# Patient Record
Sex: FEMALE | Race: WHITE | NOT HISPANIC OR LATINO | Employment: OTHER | URBAN - METROPOLITAN AREA
[De-identification: names, ages, dates, MRNs, and addresses within clinical notes are randomized per-mention and may not be internally consistent; named-entity substitution may affect disease eponyms.]

---

## 2017-01-23 ENCOUNTER — HISTORICAL (OUTPATIENT)
Dept: ADMINISTRATIVE | Facility: HOSPITAL | Age: 62
End: 2017-01-23

## 2017-03-06 ENCOUNTER — HISTORICAL (OUTPATIENT)
Dept: INFUSION THERAPY | Facility: HOSPITAL | Age: 62
End: 2017-03-06

## 2017-07-12 ENCOUNTER — HISTORICAL (OUTPATIENT)
Dept: RADIOLOGY | Facility: HOSPITAL | Age: 62
End: 2017-07-12

## 2017-07-13 ENCOUNTER — HISTORICAL (OUTPATIENT)
Dept: ADMINISTRATIVE | Facility: HOSPITAL | Age: 62
End: 2017-07-13

## 2017-07-13 LAB
ABS NEUT (OLG): 6.95 X10(3)/MCL (ref 2.1–9.2)
ALBUMIN SERPL-MCNC: 3.9 GM/DL (ref 3.4–5)
ALBUMIN/GLOB SERPL: 1.3 RATIO (ref 1.1–2)
ALP SERPL-CCNC: 67 UNIT/L (ref 38–126)
ALT SERPL-CCNC: 23 UNIT/L (ref 12–78)
APPEARANCE, UA: CLEAR
AST SERPL-CCNC: 14 UNIT/L (ref 15–37)
BACTERIA #/AREA URNS AUTO: ABNORMAL /HPF
BASOPHILS # BLD AUTO: 0.1 X10(3)/MCL (ref 0–0.2)
BASOPHILS NFR BLD AUTO: 1 %
BILIRUB SERPL-MCNC: 0.4 MG/DL (ref 0.2–1)
BILIRUB UR QL STRIP: NEGATIVE
BILIRUBIN DIRECT+TOT PNL SERPL-MCNC: 0.1 MG/DL (ref 0–0.5)
BILIRUBIN DIRECT+TOT PNL SERPL-MCNC: 0.3 MG/DL (ref 0–0.8)
BUN SERPL-MCNC: 14 MG/DL (ref 7–18)
CALCIUM SERPL-MCNC: 8.7 MG/DL (ref 8.5–10.1)
CHLORIDE SERPL-SCNC: 109 MMOL/L (ref 98–107)
CO2 SERPL-SCNC: 25 MMOL/L (ref 21–32)
COLOR UR: YELLOW
CREAT SERPL-MCNC: 0.96 MG/DL (ref 0.55–1.02)
EOSINOPHIL # BLD AUTO: 0.2 X10(3)/MCL (ref 0–0.9)
EOSINOPHIL NFR BLD AUTO: 2 %
ERYTHROCYTE [DISTWIDTH] IN BLOOD BY AUTOMATED COUNT: 13 % (ref 11.5–17)
GLOBULIN SER-MCNC: 3 GM/DL (ref 2.4–3.5)
GLUCOSE (UA): NEGATIVE
GLUCOSE SERPL-MCNC: 94 MG/DL (ref 74–106)
HAV IGM SERPL QL IA: NEGATIVE
HBV CORE IGM SERPL QL IA: NEGATIVE
HBV SURFACE AG SERPL QL IA: NEGATIVE
HCT VFR BLD AUTO: 43.7 % (ref 37–47)
HCV AB SERPL QL IA: NEGATIVE
HEPATITIS PANEL INTERP: NORMAL
HGB BLD-MCNC: 14.6 GM/DL (ref 12–16)
HGB UR QL STRIP: ABNORMAL
KETONES UR QL STRIP: NEGATIVE
LEUKOCYTE ESTERASE UR QL STRIP: ABNORMAL
LYMPHOCYTES # BLD AUTO: 1.6 X10(3)/MCL (ref 0.6–4.6)
LYMPHOCYTES NFR BLD AUTO: 17 %
MCH RBC QN AUTO: 29 PG (ref 27–31)
MCHC RBC AUTO-ENTMCNC: 33.4 GM/DL (ref 33–36)
MCV RBC AUTO: 86.9 FL (ref 80–94)
MONOCYTES # BLD AUTO: 0.4 X10(3)/MCL (ref 0.1–1.3)
MONOCYTES NFR BLD AUTO: 4 %
NEUTROPHILS # BLD AUTO: 6.95 X10(3)/MCL (ref 2.1–9.2)
NEUTROPHILS NFR BLD AUTO: 75 %
NITRITE UR QL STRIP.AUTO: POSITIVE
PH UR STRIP: 6 [PH] (ref 5–9)
PLATELET # BLD AUTO: 272 X10(3)/MCL (ref 130–400)
PMV BLD AUTO: 9.7 FL (ref 9.4–12.4)
POTASSIUM SERPL-SCNC: 3.6 MMOL/L (ref 3.5–5.1)
PROT SERPL-MCNC: 6.9 GM/DL (ref 6.4–8.2)
PROT UR QL STRIP: NEGATIVE
RBC # BLD AUTO: 5.03 X10(6)/MCL (ref 4.2–5.4)
RBC #/AREA URNS HPF: ABNORMAL /HPF
SODIUM SERPL-SCNC: 147 MMOL/L (ref 136–145)
SP GR UR STRIP: 1.02 (ref 1–1.03)
SQUAMOUS EPITHELIAL, UA: ABNORMAL
TSH SERPL-ACNC: 0.8 MIU/ML (ref 0.36–3.74)
UROBILINOGEN UR STRIP-ACNC: 0.2
WBC # SPEC AUTO: 9.3 X10(3)/MCL (ref 4.5–11.5)
WBC #/AREA URNS AUTO: ABNORMAL /HPF

## 2017-12-27 ENCOUNTER — HISTORICAL (OUTPATIENT)
Dept: ADMINISTRATIVE | Facility: HOSPITAL | Age: 62
End: 2017-12-27

## 2018-07-10 ENCOUNTER — HISTORICAL (OUTPATIENT)
Dept: INFUSION THERAPY | Facility: HOSPITAL | Age: 63
End: 2018-07-10

## 2018-07-23 ENCOUNTER — HISTORICAL (OUTPATIENT)
Dept: LAB | Facility: HOSPITAL | Age: 63
End: 2018-07-23

## 2019-02-06 ENCOUNTER — HISTORICAL (OUTPATIENT)
Dept: INFUSION THERAPY | Facility: HOSPITAL | Age: 64
End: 2019-02-06

## 2019-04-05 ENCOUNTER — HISTORICAL (OUTPATIENT)
Dept: RADIOLOGY | Facility: HOSPITAL | Age: 64
End: 2019-04-05

## 2019-07-12 ENCOUNTER — HISTORICAL (OUTPATIENT)
Dept: ADMINISTRATIVE | Facility: HOSPITAL | Age: 64
End: 2019-07-12

## 2019-12-30 ENCOUNTER — HISTORICAL (OUTPATIENT)
Dept: ADMINISTRATIVE | Facility: HOSPITAL | Age: 64
End: 2019-12-30

## 2020-07-13 ENCOUNTER — HISTORICAL (OUTPATIENT)
Dept: CARDIOLOGY | Facility: HOSPITAL | Age: 65
End: 2020-07-13

## 2020-07-13 ENCOUNTER — HISTORICAL (OUTPATIENT)
Dept: ADMINISTRATIVE | Facility: HOSPITAL | Age: 65
End: 2020-07-13

## 2020-07-29 ENCOUNTER — HISTORICAL (OUTPATIENT)
Dept: INFUSION THERAPY | Facility: HOSPITAL | Age: 65
End: 2020-07-29

## 2021-02-01 ENCOUNTER — HISTORICAL (OUTPATIENT)
Dept: INFUSION THERAPY | Facility: HOSPITAL | Age: 66
End: 2021-02-01

## 2021-07-08 ENCOUNTER — HISTORICAL (OUTPATIENT)
Dept: ADMINISTRATIVE | Facility: HOSPITAL | Age: 66
End: 2021-07-08

## 2021-07-10 LAB — FINAL CULTURE: NORMAL

## 2021-07-16 ENCOUNTER — HISTORICAL (OUTPATIENT)
Dept: RADIOLOGY | Facility: HOSPITAL | Age: 66
End: 2021-07-16

## 2021-08-02 ENCOUNTER — HISTORICAL (OUTPATIENT)
Dept: INFUSION THERAPY | Facility: HOSPITAL | Age: 66
End: 2021-08-02

## 2021-12-20 ENCOUNTER — HISTORICAL (OUTPATIENT)
Dept: ADMINISTRATIVE | Facility: HOSPITAL | Age: 66
End: 2021-12-20

## 2021-12-20 LAB
ABS NEUT (OLG): 9.1 X10(3)/MCL (ref 2.1–9.2)
BASOPHILS # BLD AUTO: 0.1 X10(3)/MCL (ref 0–0.2)
BASOPHILS NFR BLD AUTO: 1 %
BUN SERPL-MCNC: 18.9 MG/DL (ref 9.8–20.1)
CALCIUM SERPL-MCNC: 9.2 MG/DL (ref 8.7–10.5)
CHLORIDE SERPL-SCNC: 105 MMOL/L (ref 98–107)
CO2 SERPL-SCNC: 28 MMOL/L (ref 23–31)
CREAT SERPL-MCNC: 0.88 MG/DL (ref 0.55–1.02)
CREAT/UREA NIT SERPL: 21
EOSINOPHIL # BLD AUTO: 0.2 X10(3)/MCL (ref 0–0.9)
EOSINOPHIL NFR BLD AUTO: 1 %
ERYTHROCYTE [DISTWIDTH] IN BLOOD BY AUTOMATED COUNT: 13.2 % (ref 11.5–17)
GLUCOSE SERPL-MCNC: 91 MG/DL (ref 82–115)
HCT VFR BLD AUTO: 44.7 % (ref 37–47)
HGB BLD-MCNC: 14.6 GM/DL (ref 12–16)
LYMPHOCYTES # BLD AUTO: 1.7 X10(3)/MCL (ref 0.6–4.6)
LYMPHOCYTES NFR BLD AUTO: 14 %
MCH RBC QN AUTO: 29.1 PG (ref 27–31)
MCHC RBC AUTO-ENTMCNC: 32.7 GM/DL (ref 33–36)
MCV RBC AUTO: 89 FL (ref 80–94)
MONOCYTES # BLD AUTO: 0.6 X10(3)/MCL (ref 0.1–1.3)
MONOCYTES NFR BLD AUTO: 5 %
NEUTROPHILS # BLD AUTO: 9.1 X10(3)/MCL (ref 2.1–9.2)
NEUTROPHILS NFR BLD AUTO: 77 %
PLATELET # BLD AUTO: 331 X10(3)/MCL (ref 130–400)
PMV BLD AUTO: 9.7 FL (ref 9.4–12.4)
POTASSIUM SERPL-SCNC: 4.4 MMOL/L (ref 3.5–5.1)
RBC # BLD AUTO: 5.02 X10(6)/MCL (ref 4.2–5.4)
SODIUM SERPL-SCNC: 140 MMOL/L (ref 136–145)
WBC # SPEC AUTO: 11.8 X10(3)/MCL (ref 4.5–11.5)

## 2022-02-02 ENCOUNTER — HISTORICAL (OUTPATIENT)
Dept: INFUSION THERAPY | Facility: HOSPITAL | Age: 67
End: 2022-02-02

## 2022-04-05 PROBLEM — M81.0 AGE-RELATED OSTEOPOROSIS WITHOUT CURRENT PATHOLOGICAL FRACTURE: Status: ACTIVE | Noted: 2022-04-05

## 2022-04-09 ENCOUNTER — HISTORICAL (OUTPATIENT)
Dept: ADMINISTRATIVE | Facility: HOSPITAL | Age: 67
End: 2022-04-09

## 2022-04-25 VITALS
DIASTOLIC BLOOD PRESSURE: 65 MMHG | WEIGHT: 134.25 LBS | HEIGHT: 62 IN | BODY MASS INDEX: 24.7 KG/M2 | SYSTOLIC BLOOD PRESSURE: 92 MMHG | OXYGEN SATURATION: 99 %

## 2022-07-11 ENCOUNTER — LAB VISIT (OUTPATIENT)
Dept: LAB | Facility: HOSPITAL | Age: 67
End: 2022-07-11
Attending: FAMILY MEDICINE
Payer: MEDICARE

## 2022-07-11 DIAGNOSIS — I10 ESSENTIAL HYPERTENSION, MALIGNANT: Primary | ICD-10-CM

## 2022-07-11 DIAGNOSIS — E04.1 NONTOXIC UNINODULAR GOITER: ICD-10-CM

## 2022-07-11 DIAGNOSIS — E78.5 HYPERLIPIDEMIA, UNSPECIFIED HYPERLIPIDEMIA TYPE: ICD-10-CM

## 2022-07-11 DIAGNOSIS — E55.9 AVITAMINOSIS D: ICD-10-CM

## 2022-07-11 LAB
ALBUMIN SERPL-MCNC: 3.9 GM/DL (ref 3.4–4.8)
ALBUMIN/GLOB SERPL: 1.6 RATIO (ref 1.1–2)
ALP SERPL-CCNC: 79 UNIT/L (ref 40–150)
ALT SERPL-CCNC: 22 UNIT/L (ref 0–55)
AST SERPL-CCNC: 17 UNIT/L (ref 5–34)
BASOPHILS # BLD AUTO: 0.06 X10(3)/MCL (ref 0–0.2)
BASOPHILS NFR BLD AUTO: 0.6 %
BILIRUBIN DIRECT+TOT PNL SERPL-MCNC: 0.4 MG/DL
BUN SERPL-MCNC: 15.3 MG/DL (ref 9.8–20.1)
CALCIUM SERPL-MCNC: 9.4 MG/DL (ref 8.4–10.2)
CHLORIDE SERPL-SCNC: 106 MMOL/L (ref 98–107)
CHOLEST SERPL-MCNC: 146 MG/DL
CHOLEST/HDLC SERPL: 4 {RATIO} (ref 0–5)
CO2 SERPL-SCNC: 23 MMOL/L (ref 23–31)
CREAT SERPL-MCNC: 0.85 MG/DL (ref 0.55–1.02)
DEPRECATED CALCIDIOL+CALCIFEROL SERPL-MC: 43.6 NG/ML (ref 30–80)
EOSINOPHIL # BLD AUTO: 0.1 X10(3)/MCL (ref 0–0.9)
EOSINOPHIL NFR BLD AUTO: 1 %
ERYTHROCYTE [DISTWIDTH] IN BLOOD BY AUTOMATED COUNT: 13.7 % (ref 11.5–17)
GLOBULIN SER-MCNC: 2.5 GM/DL (ref 2.4–3.5)
GLUCOSE SERPL-MCNC: 71 MG/DL (ref 82–115)
HCT VFR BLD AUTO: 43.8 % (ref 37–47)
HDLC SERPL-MCNC: 38 MG/DL (ref 35–60)
HGB BLD-MCNC: 14.2 GM/DL (ref 12–16)
IMM GRANULOCYTES # BLD AUTO: 0.15 X10(3)/MCL (ref 0–0.04)
IMM GRANULOCYTES NFR BLD AUTO: 1.5 %
LDLC SERPL CALC-MCNC: 81 MG/DL (ref 50–140)
LYMPHOCYTES # BLD AUTO: 1.82 X10(3)/MCL (ref 0.6–4.6)
LYMPHOCYTES NFR BLD AUTO: 17.8 %
MCH RBC QN AUTO: 28.5 PG (ref 27–31)
MCHC RBC AUTO-ENTMCNC: 32.4 MG/DL (ref 33–36)
MCV RBC AUTO: 88 FL (ref 80–94)
MONOCYTES # BLD AUTO: 0.49 X10(3)/MCL (ref 0.1–1.3)
MONOCYTES NFR BLD AUTO: 4.8 %
NEUTROPHILS # BLD AUTO: 7.6 X10(3)/MCL (ref 2.1–9.2)
NEUTROPHILS NFR BLD AUTO: 74.3 %
NRBC BLD AUTO-RTO: 0 %
PLATELET # BLD AUTO: 312 X10(3)/MCL (ref 130–400)
PMV BLD AUTO: 9.7 FL (ref 7.4–10.4)
POTASSIUM SERPL-SCNC: 4.3 MMOL/L (ref 3.5–5.1)
PROT SERPL-MCNC: 6.4 GM/DL (ref 5.8–7.6)
RBC # BLD AUTO: 4.98 X10(6)/MCL (ref 4.2–5.4)
SODIUM SERPL-SCNC: 142 MMOL/L (ref 136–145)
TRIGL SERPL-MCNC: 134 MG/DL (ref 37–140)
TSH SERPL-ACNC: 0.92 UIU/ML (ref 0.35–4.94)
VLDLC SERPL CALC-MCNC: 27 MG/DL
WBC # SPEC AUTO: 10.2 X10(3)/MCL (ref 4.5–11.5)

## 2022-07-11 PROCEDURE — 84443 ASSAY THYROID STIM HORMONE: CPT

## 2022-07-11 PROCEDURE — 80061 LIPID PANEL: CPT

## 2022-07-11 PROCEDURE — 36415 COLL VENOUS BLD VENIPUNCTURE: CPT

## 2022-07-11 PROCEDURE — 80053 COMPREHEN METABOLIC PANEL: CPT

## 2022-07-11 PROCEDURE — 82306 VITAMIN D 25 HYDROXY: CPT

## 2022-07-11 PROCEDURE — 85025 COMPLETE CBC W/AUTO DIFF WBC: CPT

## 2022-07-18 ENCOUNTER — OFFICE VISIT (OUTPATIENT)
Dept: PRIMARY CARE CLINIC | Facility: CLINIC | Age: 67
End: 2022-07-18
Payer: MEDICARE

## 2022-07-18 VITALS
HEART RATE: 74 BPM | SYSTOLIC BLOOD PRESSURE: 130 MMHG | DIASTOLIC BLOOD PRESSURE: 72 MMHG | OXYGEN SATURATION: 98 % | WEIGHT: 133.88 LBS | BODY MASS INDEX: 24.33 KG/M2

## 2022-07-18 DIAGNOSIS — Z12.31 BREAST CANCER SCREENING BY MAMMOGRAM: ICD-10-CM

## 2022-07-18 DIAGNOSIS — E55.9 VITAMIN D DEFICIENCY: ICD-10-CM

## 2022-07-18 DIAGNOSIS — E07.9 THYROID DYSFUNCTION: ICD-10-CM

## 2022-07-18 DIAGNOSIS — G47.00 INSOMNIA, UNSPECIFIED TYPE: ICD-10-CM

## 2022-07-18 DIAGNOSIS — D64.9 ANEMIA, UNSPECIFIED TYPE: ICD-10-CM

## 2022-07-18 DIAGNOSIS — Z00.00 WELLNESS EXAMINATION: Primary | ICD-10-CM

## 2022-07-18 DIAGNOSIS — E78.5 HYPERLIPIDEMIA, UNSPECIFIED HYPERLIPIDEMIA TYPE: ICD-10-CM

## 2022-07-18 DIAGNOSIS — Z12.11 COLON CANCER SCREENING: ICD-10-CM

## 2022-07-18 LAB
APPEARANCE UR: CLEAR
BACTERIA #/AREA URNS AUTO: ABNORMAL /HPF
BILIRUB UR QL STRIP.AUTO: NEGATIVE MG/DL
COLOR UR AUTO: YELLOW
GLUCOSE UR QL STRIP.AUTO: NEGATIVE MG/DL
KETONES UR QL STRIP.AUTO: NEGATIVE MG/DL
LEUKOCYTE ESTERASE UR QL STRIP.AUTO: ABNORMAL UNIT/L
NITRITE UR QL STRIP.AUTO: NEGATIVE
PH UR STRIP.AUTO: 6.5 [PH]
PROT UR QL STRIP.AUTO: ABNORMAL MG/DL
RBC #/AREA URNS AUTO: ABNORMAL /HPF
RBC UR QL AUTO: NEGATIVE UNIT/L
SP GR UR STRIP.AUTO: 1.02 (ref 1–1.03)
SQUAMOUS #/AREA URNS AUTO: ABNORMAL /HPF
UROBILINOGEN UR STRIP-ACNC: 0.2 MG/DL
WBC #/AREA URNS AUTO: ABNORMAL /HPF

## 2022-07-18 PROCEDURE — 81001 URINALYSIS AUTO W/SCOPE: CPT | Performed by: FAMILY MEDICINE

## 2022-07-18 PROCEDURE — 99397 PR PREVENTIVE VISIT,EST,65 & OVER: ICD-10-PCS | Mod: GY,,, | Performed by: FAMILY MEDICINE

## 2022-07-18 PROCEDURE — 99397 PER PM REEVAL EST PAT 65+ YR: CPT | Mod: GY,,, | Performed by: FAMILY MEDICINE

## 2022-07-18 RX ORDER — TRIAMTERENE/HYDROCHLOROTHIAZID 37.5-25 MG
TABLET ORAL
COMMUNITY
Start: 2022-01-19 | End: 2023-04-04 | Stop reason: SDUPTHER

## 2022-07-18 RX ORDER — CYANOCOBALAMIN 1000 UG/ML
INJECTION, SOLUTION INTRAMUSCULAR; SUBCUTANEOUS
COMMUNITY
Start: 2022-02-14 | End: 2023-01-05 | Stop reason: SDUPTHER

## 2022-07-18 RX ORDER — TRAZODONE HYDROCHLORIDE 50 MG/1
50 TABLET ORAL NIGHTLY
Qty: 30 TABLET | Refills: 11 | OUTPATIENT
Start: 2022-07-18 | End: 2022-07-18 | Stop reason: SDUPTHER

## 2022-07-18 RX ORDER — LIDOCAINE 50 MG/G
OINTMENT TOPICAL
COMMUNITY
End: 2022-08-11

## 2022-07-18 RX ORDER — MUPIROCIN 20 MG/G
OINTMENT TOPICAL
COMMUNITY
Start: 2022-03-30 | End: 2022-08-11

## 2022-07-18 RX ORDER — SIMVASTATIN 5 MG/1
5 TABLET, FILM COATED ORAL
COMMUNITY
Start: 2022-01-19 | End: 2023-01-03

## 2022-07-18 RX ORDER — PREDNISONE 20 MG/1
TABLET ORAL
COMMUNITY
End: 2022-08-11

## 2022-07-18 RX ORDER — HYDROCODONE BITARTRATE AND ACETAMINOPHEN 10; 325 MG/1; MG/1
TABLET ORAL
COMMUNITY

## 2022-07-18 RX ORDER — DICLOFENAC SODIUM 30 MG/G
GEL TOPICAL
COMMUNITY
End: 2022-08-11

## 2022-07-18 RX ORDER — PRAVASTATIN SODIUM 20 MG/1
TABLET ORAL
COMMUNITY
End: 2022-08-11

## 2022-07-18 RX ORDER — FLUOCINONIDE 1 MG/G
CREAM TOPICAL
COMMUNITY
End: 2022-08-11

## 2022-07-18 RX ORDER — TRETINOIN 0.25 MG/G
CREAM TOPICAL
COMMUNITY
Start: 2022-07-14 | End: 2022-08-11

## 2022-07-18 RX ORDER — DOXYCYCLINE 50 MG/1
100 TABLET ORAL NIGHTLY
COMMUNITY
Start: 2022-07-12 | End: 2022-08-11

## 2022-07-18 RX ORDER — TRAZODONE HYDROCHLORIDE 50 MG/1
50 TABLET ORAL NIGHTLY
Qty: 30 TABLET | Refills: 11 | Status: SHIPPED | OUTPATIENT
Start: 2022-07-18 | End: 2022-08-11

## 2022-07-18 RX ORDER — DOXEPIN HYDROCHLORIDE 50 MG/G
CREAM TOPICAL
COMMUNITY
End: 2022-08-11

## 2022-07-18 NOTE — PROGRESS NOTES
Chief Complaint  Chief Complaint   Patient presents with    Medicare AWV       History of Present Illness  Patient presents clinic for routine follow-up wellness visit with lab review.  Blood work performed prior to this visit includes cbc and CMP with no overly concerning findings noted with vitamin-D levels within normal range as well as TSH with FLP showing LDL at 81. Blood pressure within normal limits in clinic at this time the patient reports compliance with current medication regimen as noted below.  She does report cloudy urine today.    Review of Systems  Review of Systems   Constitutional: Negative for chills and fever.   HENT: Negative for congestion and sore throat.    Eyes: Negative for redness and visual disturbance.   Respiratory: Negative for cough and shortness of breath.    Cardiovascular: Negative for chest pain and palpitations.   Gastrointestinal: Negative for nausea and vomiting.   Genitourinary: Negative for dysuria and frequency.        Cloudy urine   Musculoskeletal: Negative for arthralgias and myalgias.   Skin: Negative for pallor and rash.   Neurological: Negative for dizziness and headaches.   Psychiatric/Behavioral: Negative for agitation and dysphoric mood.       Physical Exam  Physical Exam  Constitutional:       Appearance: Normal appearance.   HENT:      Head: Normocephalic and atraumatic.   Eyes:      General: No scleral icterus.     Conjunctiva/sclera: Conjunctivae normal.   Cardiovascular:      Rate and Rhythm: Normal rate and regular rhythm.   Pulmonary:      Effort: Pulmonary effort is normal.      Breath sounds: Normal breath sounds.   Skin:     General: Skin is warm and dry.   Neurological:      General: No focal deficit present.      Mental Status: She is alert and oriented to person, place, and time.   Psychiatric:         Mood and Affect: Mood normal.         Behavior: Behavior normal.         Problem List/Past Medical History  Past Medical History:   Diagnosis Date  "   Cerebral aneurysm     HTN (hypertension)     Osteoporosis     Vitamin D deficiency        Procedure/Surgical History  Past Surgical History:   Procedure Laterality Date    TOTAL HIP ARTHROPLASTY      TUBAL LIGATION      WISDOM TOOTH EXTRACTION         Medications  Current Outpatient Medications on File Prior to Visit   Medication Sig Dispense Refill    doxycycline (ADOXA) 50 MG tablet Take 100 mg by mouth nightly.       No current facility-administered medications on file prior to visit.       Allegies  Review of patient's allergies indicates:   Allergen Reactions    Azithromycin      Other reaction(s): wakes up, wakes up "freaking out"    Codeine Itching    Cortisone      Other reaction(s): feels like she isnt breathing        Social History  Social History     Socioeconomic History    Marital status: Single   Tobacco Use    Smoking status: Current Every Day Smoker     Packs/day: 1.00    Smokeless tobacco: Never Used   Substance and Sexual Activity    Alcohol use: Never    Drug use: Never    Sexual activity: Yes       Family History  History reviewed. No pertinent family history.      Immunizations    There is no immunization history on file for this patient.    Health Maintenance  Health Maintenance   Topic Date Due    TETANUS VACCINE  Never done    DEXA Scan  04/05/2022    Mammogram  07/16/2022    Lipid Panel  07/11/2027    Hepatitis C Screening  Completed        Assessment / Plan  Problem List Items Addressed This Visit        Other    Wellness examination - Primary    Current Assessment & Plan     Discussed routine annual health maintenance and screening in addition to acute issues noted below.             Urine shows no signs concerning for infection, we will send for urinalysis    RTC 12 months for wellness    Nathan Ferrara     "

## 2022-08-02 ENCOUNTER — INFUSION (OUTPATIENT)
Dept: INFUSION THERAPY | Facility: HOSPITAL | Age: 67
End: 2022-08-02
Attending: FAMILY MEDICINE
Payer: MEDICARE

## 2022-08-02 VITALS
HEART RATE: 81 BPM | SYSTOLIC BLOOD PRESSURE: 141 MMHG | RESPIRATION RATE: 18 BRPM | DIASTOLIC BLOOD PRESSURE: 83 MMHG | TEMPERATURE: 98 F | OXYGEN SATURATION: 97 %

## 2022-08-02 DIAGNOSIS — M81.0 AGE-RELATED OSTEOPOROSIS WITHOUT CURRENT PATHOLOGICAL FRACTURE: Primary | ICD-10-CM

## 2022-08-02 DIAGNOSIS — M81.0 AGE-RELATED OSTEOPOROSIS WITHOUT CURRENT PATHOLOGICAL FRACTURE: ICD-10-CM

## 2022-08-02 PROCEDURE — 96372 THER/PROPH/DIAG INJ SC/IM: CPT

## 2022-08-02 PROCEDURE — 63600175 PHARM REV CODE 636 W HCPCS: Mod: JG | Performed by: FAMILY MEDICINE

## 2022-08-02 RX ADMIN — DENOSUMAB 60 MG: 60 INJECTION SUBCUTANEOUS at 01:08

## 2022-08-11 ENCOUNTER — OFFICE VISIT (OUTPATIENT)
Dept: URGENT CARE | Facility: CLINIC | Age: 67
End: 2022-08-11
Payer: MEDICARE

## 2022-08-11 VITALS
HEIGHT: 62 IN | TEMPERATURE: 100 F | WEIGHT: 130 LBS | SYSTOLIC BLOOD PRESSURE: 104 MMHG | DIASTOLIC BLOOD PRESSURE: 66 MMHG | OXYGEN SATURATION: 97 % | HEART RATE: 116 BPM | BODY MASS INDEX: 23.92 KG/M2 | RESPIRATION RATE: 24 BRPM

## 2022-08-11 DIAGNOSIS — K59.00 CONSTIPATION, UNSPECIFIED CONSTIPATION TYPE: Primary | ICD-10-CM

## 2022-08-11 PROCEDURE — 99213 PR OFFICE/OUTPT VISIT, EST, LEVL III, 20-29 MIN: ICD-10-PCS | Mod: ,,, | Performed by: PHYSICIAN ASSISTANT

## 2022-08-11 PROCEDURE — 99213 OFFICE O/P EST LOW 20 MIN: CPT | Mod: ,,, | Performed by: PHYSICIAN ASSISTANT

## 2022-08-11 RX ORDER — CLINDAMYCIN PHOSPHATE 11.9 MG/ML
SOLUTION TOPICAL
COMMUNITY
Start: 2022-08-11 | End: 2023-06-15 | Stop reason: ALTCHOICE

## 2022-08-11 RX ORDER — TRIAMTERENE AND HYDROCHLOROTHIAZIDE 37.5; 25 MG/1; MG/1
CAPSULE ORAL
COMMUNITY
End: 2023-06-15 | Stop reason: SDUPTHER

## 2022-08-11 NOTE — PATIENT INSTRUCTIONS
Increase fluids.  Over-the-counter laxative  .  Glycerin suppositories as discussed.    Follow-up with your primary care doctor  Go to the emergency department with any change or worsening of symptoms

## 2022-08-11 NOTE — PROGRESS NOTES
"Subjective:       Patient ID: Prudence Granados is a 67 y.o. female.    Vitals:  height is 5' 2" (1.575 m) and weight is 59 kg (130 lb). Her temperature is 99.5 °F (37.5 °C). Her blood pressure is 104/66 and her pulse is 116 (abnormal). Her respiration is 24 (abnormal) and oxygen saturation is 97%.     Chief Complaint: Constipation    Pt has had a BM in the last 6 days BM, pain to poop, dehydrated. Pt did two enemas and took Miralax.  Patient is unsure as to why she may be dehydrated but has not been experiencing some dehydration dry mouth and decreased urination.  Noted mildly elevated temperature on intake.  No reported fever at home.    ROS    Objective:      Physical Exam   Constitutional: She is oriented to person, place, and time. She appears well-developed.   HENT:   Head: Normocephalic and atraumatic.   Ears:   Right Ear: External ear normal.   Left Ear: External ear normal.   Nose: Nose normal.   Mouth/Throat: Mucous membranes are normal.   Eyes: Conjunctivae and lids are normal.   Neck: Trachea normal. Neck supple.   Cardiovascular: Normal rate, regular rhythm and normal heart sounds.   Pulmonary/Chest: Effort normal and breath sounds normal. No respiratory distress.   Abdominal: Normal appearance. She exhibits no abdominal bruit, no pulsatile midline mass and no mass. Soft. There is abdominal tenderness.   Musculoskeletal: Normal range of motion.         General: Normal range of motion.   Neurological: She is alert and oriented to person, place, and time. She has normal strength.   Skin: Skin is warm, dry, intact, not diaphoretic and not pale.   Psychiatric: Her speech is normal and behavior is normal. Judgment and thought content normal.   Nursing note and vitals reviewed.  Abdomen:  Sluggish bowel sounds.  Mild TTP noted throughout the lower abdomen.  X-rays-increased stool presents Noted.  Awaiting Radiology over-read.         Previous History      Review of patient's allergies indicates:   Allergen " "Reactions    Azithromycin      Other reaction(s): wakes up, wakes up "freaking out"    Codeine Itching    Cortisone      Other reaction(s): feels like she isnt breathing       Past Medical History:   Diagnosis Date    Cerebral aneurysm     HTN (hypertension)     Osteoporosis     Vitamin D deficiency      Current Outpatient Medications   Medication Instructions    clindamycin (CLEOCIN T) 1 % external solution Topical (Top)    cyanocobalamin 1,000 mcg/mL injection INJECT 1 ML IN THE MUSCLE EVERY 3 WEEKS    HYDROcodone-acetaminophen (NORCO)  mg per tablet hydrocodone 10 mg-acetaminophen 325 mg tablet    simvastatin (ZOCOR) 5 mg, Oral    triamterene-hydrochlorothiazide 37.5-25 mg (DYAZIDE) 37.5-25 mg per capsule triamterene 37.5 mg-hydrochlorothiazide 25 mg capsule    triamterene-hydrochlorothiazide 37.5-25 mg (MAXZIDE-25) 37.5-25 mg per tablet Oral     Past Surgical History:   Procedure Laterality Date    TOTAL HIP ARTHROPLASTY      TUBAL LIGATION      WISDOM TOOTH EXTRACTION       History reviewed. No pertinent family history.    Social History     Tobacco Use    Smoking status: Current Every Day Smoker     Packs/day: 1.00     Types: Cigarettes    Smokeless tobacco: Never Used   Substance Use Topics    Alcohol use: Never    Drug use: Never        Physical Exam      Vital Signs Reviewed   /66   Pulse (!) 116   Temp 99.5 °F (37.5 °C)   Resp (!) 24   Ht 5' 2" (1.575 m)   Wt 59 kg (130 lb)   SpO2 97%   BMI 23.78 kg/m²        Procedures    Procedures     Labs     Results for orders placed or performed in visit on 07/18/22   Urinalysis, Reflex to Urine Culture Urine, Clean Catch    Specimen: Urine   Result Value Ref Range    Color, UA Yellow Yellow, Colorless, Other, Clear    Appearance, UA Clear Clear    Specific Gravity, UA 1.016 1.001 - 1.030    pH, UA 6.5 5.0, 5.5, 6.0, 6.5, 7.0, 7.5, 8.0, 8.5    Protein, UA Trace (A) Negative, 300  mg/dL    Glucose, UA Negative Negative, Normal " mg/dL    Ketones, UA Negative Negative, +1, +2, +3, +4, +5, >=160, >=80 mg/dL    Blood, UA Negative Negative unit/L    Bilirubin, UA Negative Negative mg/dL    Urobilinogen, UA 0.2 0.2, 1.0, Normal mg/dL    Nitrites, UA Negative Negative    Leukocyte Esterase, UA 1+ (A) Negative, 75  unit/L   Urinalysis, Microscopic   Result Value Ref Range    RBC, UA 3-5 None Seen, 0-2, 3-5, 0-5 /HPF    WBC, UA 3-5 None Seen, 0-2, 3-5, 0-5 /HPF    Squamous Epithelial Cells, UA 8-12 (A) 0-4, None Seen /HPF    Bacteria, UA 1+ (A) None Seen, Rare, Occasional /HPF         Assessment:       1. Constipation, unspecified constipation type          Plan:         Constipation, unspecified constipation type  -     XR ABDOMEN FLAT AND ERECT; Future; Expected date: 08/11/2022      Increase fluids.  Over-the-counter laxative  .  Glycerin suppositories as discussed.    Follow-up with your primary care doctor  Go to the emergency department with any change or worsening of symptoms

## 2022-10-04 ENCOUNTER — HOSPITAL ENCOUNTER (OUTPATIENT)
Dept: RADIOLOGY | Facility: HOSPITAL | Age: 67
Discharge: HOME | End: 2022-10-04
Attending: FAMILY MEDICINE
Payer: MEDICARE

## 2022-10-04 DIAGNOSIS — Z12.31 BREAST CANCER SCREENING BY MAMMOGRAM: ICD-10-CM

## 2022-10-04 PROCEDURE — 77063 BREAST TOMOSYNTHESIS BI: CPT | Mod: 26,,, | Performed by: STUDENT IN AN ORGANIZED HEALTH CARE EDUCATION/TRAINING PROGRAM

## 2022-10-04 PROCEDURE — 77067 SCR MAMMO BI INCL CAD: CPT | Mod: 26,,, | Performed by: STUDENT IN AN ORGANIZED HEALTH CARE EDUCATION/TRAINING PROGRAM

## 2022-10-04 PROCEDURE — 77067 SCR MAMMO BI INCL CAD: CPT | Mod: TC

## 2022-10-04 PROCEDURE — 77063 MAMMO DIGITAL SCREENING BILAT WITH TOMO: ICD-10-PCS | Mod: 26,,, | Performed by: STUDENT IN AN ORGANIZED HEALTH CARE EDUCATION/TRAINING PROGRAM

## 2022-10-04 PROCEDURE — 77067 MAMMO DIGITAL SCREENING BILAT WITH TOMO: ICD-10-PCS | Mod: 26,,, | Performed by: STUDENT IN AN ORGANIZED HEALTH CARE EDUCATION/TRAINING PROGRAM

## 2022-10-17 PROBLEM — Z00.00 WELLNESS EXAMINATION: Status: RESOLVED | Noted: 2022-07-18 | Resolved: 2022-10-17

## 2022-12-14 LAB — CRC RECOMMENDATION EXT: NORMAL

## 2023-01-05 ENCOUNTER — OFFICE VISIT (OUTPATIENT)
Dept: PRIMARY CARE CLINIC | Facility: CLINIC | Age: 68
End: 2023-01-05
Payer: MEDICARE

## 2023-01-05 VITALS
OXYGEN SATURATION: 94 % | SYSTOLIC BLOOD PRESSURE: 138 MMHG | BODY MASS INDEX: 23.39 KG/M2 | HEART RATE: 89 BPM | WEIGHT: 127.88 LBS | DIASTOLIC BLOOD PRESSURE: 87 MMHG

## 2023-01-05 DIAGNOSIS — M62.830 SPASM OF THORACIC BACK MUSCLE: ICD-10-CM

## 2023-01-05 DIAGNOSIS — R53.83 FATIGUE, UNSPECIFIED TYPE: Primary | ICD-10-CM

## 2023-01-05 DIAGNOSIS — Z93.3 COLOSTOMY STATUS: ICD-10-CM

## 2023-01-05 PROCEDURE — 99214 OFFICE O/P EST MOD 30 MIN: CPT | Mod: ,,, | Performed by: FAMILY MEDICINE

## 2023-01-05 PROCEDURE — 99214 PR OFFICE/OUTPT VISIT, EST, LEVL IV, 30-39 MIN: ICD-10-PCS | Mod: ,,, | Performed by: FAMILY MEDICINE

## 2023-01-05 RX ORDER — CYANOCOBALAMIN 1000 UG/ML
1000 INJECTION, SOLUTION INTRAMUSCULAR; SUBCUTANEOUS
Qty: 10 ML | Refills: 3 | Status: SHIPPED | OUTPATIENT
Start: 2023-01-05 | End: 2024-01-16 | Stop reason: SDUPTHER

## 2023-01-05 RX ORDER — TIZANIDINE 4 MG/1
4 TABLET ORAL EVERY 8 HOURS
Qty: 30 TABLET | Refills: 0 | Status: SHIPPED | OUTPATIENT
Start: 2023-01-05 | End: 2023-01-15

## 2023-02-28 ENCOUNTER — TELEPHONE (OUTPATIENT)
Dept: PRIMARY CARE CLINIC | Facility: CLINIC | Age: 68
End: 2023-02-28
Payer: MEDICARE

## 2023-03-06 ENCOUNTER — OFFICE VISIT (OUTPATIENT)
Dept: PRIMARY CARE CLINIC | Facility: CLINIC | Age: 68
End: 2023-03-06
Payer: MEDICARE

## 2023-03-06 VITALS
SYSTOLIC BLOOD PRESSURE: 120 MMHG | BODY MASS INDEX: 21.02 KG/M2 | HEART RATE: 106 BPM | DIASTOLIC BLOOD PRESSURE: 71 MMHG | OXYGEN SATURATION: 100 % | WEIGHT: 114.88 LBS

## 2023-03-06 DIAGNOSIS — F17.210 CIGARETTE SMOKER: ICD-10-CM

## 2023-03-06 DIAGNOSIS — K57.92 DIVERTICULITIS: Primary | ICD-10-CM

## 2023-03-06 PROCEDURE — 99214 OFFICE O/P EST MOD 30 MIN: CPT | Mod: ,,, | Performed by: FAMILY MEDICINE

## 2023-03-06 PROCEDURE — 99214 PR OFFICE/OUTPT VISIT, EST, LEVL IV, 30-39 MIN: ICD-10-PCS | Mod: ,,, | Performed by: FAMILY MEDICINE

## 2023-03-06 NOTE — PROGRESS NOTES
Chief Complaint  Chief Complaint   Patient presents with    elevated heartrate     Started for about a few weeks, states possible dehydrated       History of Present Illness  Patient presents to scheduled follow-up appointment.  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  Hemoglobin and hematocrit were checked on the day following the procedure which were noted to be low as expected at 8.5/27.0 but since release from the hospital has had no symptoms or signs concerning for symptomatic anemia.  Her blood pressure is within normal limits in clinic at this time since she is producing stool so without blood and no overt pain as than routine healing from procedure. There was a report of her being dehydrated but is now hydrating well and did have blood in stool for the first 2 weeks after the procedure, but not since that time. There will be another f/u with GI in 3 weeks.  She continues tobacco abuse.    Review of Systems  Review of Systems   Constitutional:  Negative for chills and fever.   HENT:  Negative for congestion and sore throat.    Eyes:  Negative for redness and visual disturbance.   Respiratory:  Negative for cough and shortness of breath.    Cardiovascular:  Negative for chest pain and palpitations.   Gastrointestinal:  Negative for nausea and vomiting.   Genitourinary:  Negative for dysuria and frequency.   Musculoskeletal:  Positive for back pain. Negative for arthralgias and myalgias.   Skin:  Negative for pallor and rash.   Neurological:  Negative for dizziness and headaches.   Psychiatric/Behavioral:  Negative for agitation and dysphoric mood. The patient is nervous/anxious.      Physical Exam  Physical Exam  Constitutional:       Appearance: Normal  appearance.   HENT:      Head: Normocephalic and atraumatic.   Eyes:      General: No scleral icterus.     Conjunctiva/sclera: Conjunctivae normal.   Cardiovascular:      Rate and Rhythm: Normal rate and regular rhythm.   Pulmonary:      Effort: Pulmonary effort is normal.      Breath sounds: Normal breath sounds.   Abdominal:      Comments: Well-healed surgical scar noted at lower abdominal region   Skin:     General: Skin is warm and dry.   Neurological:      General: No focal deficit present.      Mental Status: She is alert and oriented to person, place, and time.   Psychiatric:         Mood and Affect: Mood normal.         Behavior: Behavior normal.       Problem List/Past Medical History  Past Medical History:   Diagnosis Date    Cerebral aneurysm     HTN (hypertension)     Osteoporosis     Vitamin D deficiency        Procedure/Surgical History  Past Surgical History:   Procedure Laterality Date    COLOSTOMY  08/10/2022    colostomy reversal      ruptured colon      TOTAL HIP ARTHROPLASTY      TUBAL LIGATION      WISDOM TOOTH EXTRACTION         Medications  Current Outpatient Medications on File Prior to Visit   Medication Sig Dispense Refill    clindamycin (CLEOCIN T) 1 % external solution Apply topically.      cyanocobalamin 1,000 mcg/mL injection Inject 1 mL (1,000 mcg total) into the muscle every 21 days. 10 mL 3    simvastatin (ZOCOR) 5 MG tablet TAKE 1 TABLET BY MOUTH EVERY DAY AT BEDTIME 90 tablet 3    triamterene-hydrochlorothiazide 37.5-25 mg (DYAZIDE) 37.5-25 mg per capsule triamterene 37.5 mg-hydrochlorothiazide 25 mg capsule      triamterene-hydrochlorothiazide 37.5-25 mg (MAXZIDE-25) 37.5-25 mg per tablet Take by mouth.      HYDROcodone-acetaminophen (NORCO)  mg per tablet hydrocodone 10 mg-acetaminophen 325 mg tablet       No current facility-administered medications on file prior to visit.       Allegies  Review of patient's allergies indicates:   Allergen Reactions    Azithromycin       "Other reaction(s): wakes up, wakes up "freaking out"    Codeine Itching    Cortisone      Other reaction(s): feels like she isnt breathing        Social History  Social History     Socioeconomic History    Marital status: Single   Tobacco Use    Smoking status: Every Day     Packs/day: 1.00     Types: Cigarettes    Smokeless tobacco: Never   Substance and Sexual Activity    Alcohol use: Never    Drug use: Never    Sexual activity: Yes       Family History  History reviewed. No pertinent family history.      Immunizations  Immunization History   Administered Date(s) Administered    COVID-19, MRNA, LN-S, PF (MODERNA FULL 0.5 ML DOSE) 02/10/2021, 03/12/2021    Influenza - Quadrivalent - PF *Preferred* (6 months and older) 10/28/2016, 11/21/2017    Influenza - Trivalent - PF (ADULT) 09/19/2014, 08/26/2018, 08/28/2018, 10/01/2019, 10/03/2020    Pneumococcal Conjugate - 13 Valent 01/18/2021    Pneumococcal Polysaccharide - 23 Valent 07/15/2021       Health Maintenance  Health Maintenance   Topic Date Due    TETANUS VACCINE  Never done    DEXA Scan  04/05/2022    Mammogram  10/05/2023    Lipid Panel  07/11/2027    Hepatitis C Screening  Completed        Assessment / Plan  Problem List Items Addressed This Visit          GI    Diverticulitis - Primary    Overview     Status post reanastomosis procedure continue follow-up with Colorectal Surgical specialist for routine monitoring and management.            Other    Cigarette smoker    Overview     Discussed smoking cessation with patient for >10 minutes.  Discussed the risks of continuing to smoke which include emphysema/COPD, cardiovascular disease, lung cancer, and early death.  Discussed benefits of smoking cessation which include quality of life improvement.  Patient verbalized understanding and at this point is in the pre-contemplative stage of smoking cessation.        RTC 3 months    Nathan Ferrara     "

## 2023-04-04 DIAGNOSIS — I10 HYPERTENSION, UNSPECIFIED TYPE: Primary | ICD-10-CM

## 2023-04-04 RX ORDER — TRIAMTERENE/HYDROCHLOROTHIAZID 37.5-25 MG
1 TABLET ORAL EVERY OTHER DAY
Qty: 30 TABLET | Refills: 3 | Status: SHIPPED | OUTPATIENT
Start: 2023-04-04 | End: 2023-07-19 | Stop reason: SDUPTHER

## 2023-06-06 ENCOUNTER — LAB VISIT (OUTPATIENT)
Dept: LAB | Facility: HOSPITAL | Age: 68
End: 2023-06-06
Attending: FAMILY MEDICINE
Payer: MEDICARE

## 2023-06-06 DIAGNOSIS — E78.5 HYPERLIPIDEMIA, UNSPECIFIED HYPERLIPIDEMIA TYPE: ICD-10-CM

## 2023-06-06 DIAGNOSIS — D64.9 ANEMIA, UNSPECIFIED TYPE: ICD-10-CM

## 2023-06-06 DIAGNOSIS — E07.9 THYROID DYSFUNCTION: ICD-10-CM

## 2023-06-06 DIAGNOSIS — Z00.00 WELLNESS EXAMINATION: ICD-10-CM

## 2023-06-06 DIAGNOSIS — E55.9 VITAMIN D DEFICIENCY: ICD-10-CM

## 2023-06-06 LAB
ALBUMIN SERPL-MCNC: 3.5 G/DL (ref 3.4–4.8)
ALBUMIN/GLOB SERPL: 1 RATIO (ref 1.1–2)
ALP SERPL-CCNC: 126 UNIT/L (ref 40–150)
ALT SERPL-CCNC: 23 UNIT/L (ref 0–55)
AST SERPL-CCNC: 19 UNIT/L (ref 5–34)
BASOPHILS # BLD AUTO: 0.04 X10(3)/MCL
BASOPHILS NFR BLD AUTO: 0.4 %
BILIRUBIN DIRECT+TOT PNL SERPL-MCNC: 0.3 MG/DL
BUN SERPL-MCNC: 21.6 MG/DL (ref 9.8–20.1)
CALCIUM SERPL-MCNC: 9.6 MG/DL (ref 8.4–10.2)
CHLORIDE SERPL-SCNC: 107 MMOL/L (ref 98–107)
CHOLEST SERPL-MCNC: 138 MG/DL
CHOLEST/HDLC SERPL: 4 {RATIO} (ref 0–5)
CO2 SERPL-SCNC: 28 MMOL/L (ref 23–31)
CREAT SERPL-MCNC: 0.91 MG/DL (ref 0.55–1.02)
DEPRECATED CALCIDIOL+CALCIFEROL SERPL-MC: 57 NG/ML (ref 30–80)
EOSINOPHIL # BLD AUTO: 0.05 X10(3)/MCL (ref 0–0.9)
EOSINOPHIL NFR BLD AUTO: 0.4 %
ERYTHROCYTE [DISTWIDTH] IN BLOOD BY AUTOMATED COUNT: 14.9 % (ref 11.5–17)
GFR SERPLBLD CREATININE-BSD FMLA CKD-EPI: >60 MLS/MIN/1.73/M2
GLOBULIN SER-MCNC: 3.6 GM/DL (ref 2.4–3.5)
GLUCOSE SERPL-MCNC: 86 MG/DL (ref 82–115)
HCT VFR BLD AUTO: 38.6 % (ref 37–47)
HDLC SERPL-MCNC: 39 MG/DL (ref 35–60)
HGB BLD-MCNC: 11.6 G/DL (ref 12–16)
IMM GRANULOCYTES # BLD AUTO: 0.11 X10(3)/MCL (ref 0–0.04)
IMM GRANULOCYTES NFR BLD AUTO: 1 %
LDLC SERPL CALC-MCNC: 81 MG/DL (ref 50–140)
LYMPHOCYTES # BLD AUTO: 1.63 X10(3)/MCL (ref 0.6–4.6)
LYMPHOCYTES NFR BLD AUTO: 14.4 %
MCH RBC QN AUTO: 26.4 PG (ref 27–31)
MCHC RBC AUTO-ENTMCNC: 30.1 G/DL (ref 33–36)
MCV RBC AUTO: 87.9 FL (ref 80–94)
MONOCYTES # BLD AUTO: 0.48 X10(3)/MCL (ref 0.1–1.3)
MONOCYTES NFR BLD AUTO: 4.2 %
NEUTROPHILS # BLD AUTO: 9.04 X10(3)/MCL (ref 2.1–9.2)
NEUTROPHILS NFR BLD AUTO: 79.6 %
NRBC BLD AUTO-RTO: 0 %
PLATELET # BLD AUTO: 427 X10(3)/MCL (ref 130–400)
PMV BLD AUTO: 9 FL (ref 7.4–10.4)
POTASSIUM SERPL-SCNC: 4.6 MMOL/L (ref 3.5–5.1)
PROT SERPL-MCNC: 7.1 GM/DL (ref 5.8–7.6)
RBC # BLD AUTO: 4.39 X10(6)/MCL (ref 4.2–5.4)
SODIUM SERPL-SCNC: 143 MMOL/L (ref 136–145)
TRIGL SERPL-MCNC: 89 MG/DL (ref 37–140)
TSH SERPL-ACNC: 0.68 UIU/ML (ref 0.35–4.94)
VLDLC SERPL CALC-MCNC: 18 MG/DL
WBC # SPEC AUTO: 11.35 X10(3)/MCL (ref 4.5–11.5)

## 2023-06-06 PROCEDURE — 85025 COMPLETE CBC W/AUTO DIFF WBC: CPT

## 2023-06-06 PROCEDURE — 36415 COLL VENOUS BLD VENIPUNCTURE: CPT

## 2023-06-06 PROCEDURE — 80061 LIPID PANEL: CPT

## 2023-06-06 PROCEDURE — 84443 ASSAY THYROID STIM HORMONE: CPT

## 2023-06-06 PROCEDURE — 80053 COMPREHEN METABOLIC PANEL: CPT

## 2023-06-06 PROCEDURE — 82306 VITAMIN D 25 HYDROXY: CPT

## 2023-06-14 PROBLEM — D64.9 NORMOCYTIC ANEMIA: Status: ACTIVE | Noted: 2023-06-14

## 2023-06-14 NOTE — PROGRESS NOTES
"Date: 06/15/2023  Patient ID: [id number]   Chief Complaint: Follow-up (With labs)    HPI:   Prudence Garnados is a 67 y.o. female here today for a follow up of Follow-up (With labs)  Recent labs showed mildly decreased hemoglobin and increased platelets.  other labs WNL. Patient does have h/o colonic rupture with blood loss 8/2023. Hair loss improved, which was why vit D levels dn TSH were checked. Denies cp, sob, weakness, lightheadedness.     Past Medical History:   Diagnosis Date    Cerebral aneurysm     HTN (hypertension)     Osteoporosis     Vitamin D deficiency        Past Surgical History:   Procedure Laterality Date    COLOSTOMY  08/10/2022    colostomy reversal      ruptured colon      TOTAL HIP ARTHROPLASTY      TUBAL LIGATION      WISDOM TOOTH EXTRACTION         Review of patient's allergies indicates:   Allergen Reactions    Azithromycin      Other reaction(s): wakes up, wakes up "freaking out"    Codeine Itching    Cortisone      Other reaction(s): feels like she isnt breathing       Outpatient Medications Marked as Taking for the 6/15/23 encounter (Office Visit) with Daysi Disla MD   Medication Sig Dispense Refill    cyanocobalamin 1,000 mcg/mL injection Inject 1 mL (1,000 mcg total) into the muscle every 21 days. 10 mL 3    simvastatin (ZOCOR) 5 MG tablet TAKE 1 TABLET BY MOUTH EVERY DAY AT BEDTIME 90 tablet 3    triamterene-hydrochlorothiazide 37.5-25 mg (MAXZIDE-25) 37.5-25 mg per tablet Take 1 tablet by mouth every other day. 30 tablet 3    [DISCONTINUED] triamterene-hydrochlorothiazide 37.5-25 mg (DYAZIDE) 37.5-25 mg per capsule triamterene 37.5 mg-hydrochlorothiazide 25 mg capsule         History reviewed. No pertinent family history.     Social History     Socioeconomic History    Marital status: Single   Tobacco Use    Smoking status: Every Day     Packs/day: 1.00     Types: Cigarettes    Smokeless tobacco: Never   Substance and Sexual Activity    Alcohol use: Never    Drug use: Never    Sexual " activity: Yes       Patient Care Team:  Daysi Disla MD as PCP - General (Family Medicine)     Subjective:     ROS  See HPI for details  All Other ROS: Negative except as stated in HPI    Objective:     /74   Pulse 74   Wt 51.8 kg (114 lb 4.8 oz)   SpO2 96%   BMI 20.91 kg/m²   Physical Exam  Cardiovascular:      Rate and Rhythm: Normal rate and regular rhythm.      Heart sounds: Normal heart sounds.   Pulmonary:      Effort: Pulmonary effort is normal.      Breath sounds: Normal breath sounds.   Musculoskeletal:      Comments: Ambulates with cane   Skin:     Comments: New hair growth on scalp       Assessment:       ICD-10-CM ICD-9-CM   1. Normocytic anemia  D64.9 285.9   2. Age-related osteoporosis without current pathological fracture  M81.0 733.01   3. Hair loss  L65.9 704.00   4. Hypertension, unspecified type  I10 401.9        Plan:     1. Normocytic anemia  Overview:  Improving  Asymptomatic  Continue to monitor with routine labs    Orders:  -     CBC Auto Differential; Future; Expected date: 06/15/2023    2. Age-related osteoporosis without current pathological fracture  Overview:  Going to PT  Continue Prolia  Will check previous DEXA to see when next is due    Orders:  -     TSH; Future; Expected date: 06/15/2023  -     T4, Free; Future; Expected date: 06/15/2023  -     CBC Auto Differential; Future; Expected date: 06/15/2023    3. Hair loss  Overview:  Recent labs wnl  Improving  Will check in future to see trend    Orders:  -     TSH; Future; Expected date: 06/15/2023  -     T4, Free; Future; Expected date: 06/15/2023    4. Hypertension, unspecified type  Overview:  Blood pressure goal <140/90 (<130/80 if otherwise noted), recommend DASH diet, record BP at home daily and bring log to next office visit, assure that home cuff is calibrated at minimum every 12 months, continue current medication regimen.      Orders:  -     triamterene-hydrochlorothiazide 37.5-25 mg (DYAZIDE) 37.5-25 mg per  capsule; Take 1 capsule by mouth once daily.  Dispense: 90 capsule; Refill: 3         Medication List with Changes/Refills   Current Medications    CLINDAMYCIN (CLEOCIN T) 1 % EXTERNAL SOLUTION    Apply topically.       Start Date: 8/11/2022 End Date: --    CYANOCOBALAMIN 1,000 MCG/ML INJECTION    Inject 1 mL (1,000 mcg total) into the muscle every 21 days.       Start Date: 1/5/2023  End Date: --    HYDROCODONE-ACETAMINOPHEN (NORCO)  MG PER TABLET    hydrocodone 10 mg-acetaminophen 325 mg tablet       Start Date: --        End Date: --    SIMVASTATIN (ZOCOR) 5 MG TABLET    TAKE 1 TABLET BY MOUTH EVERY DAY AT BEDTIME       Start Date: 1/3/2023  End Date: --    TRIAMTERENE-HYDROCHLOROTHIAZIDE 37.5-25 MG (MAXZIDE-25) 37.5-25 MG PER TABLET    Take 1 tablet by mouth every other day.       Start Date: 4/4/2023  End Date: --   Changed and/or Refilled Medications    Modified Medication Previous Medication    TRIAMTERENE-HYDROCHLOROTHIAZIDE 37.5-25 MG (DYAZIDE) 37.5-25 MG PER CAPSULE triamterene-hydrochlorothiazide 37.5-25 mg (DYAZIDE) 37.5-25 mg per capsule       Take 1 capsule by mouth once daily.    triamterene 37.5 mg-hydrochlorothiazide 25 mg capsule       Start Date: 6/15/2023 End Date: 6/14/2024    Start Date: --        End Date: 6/15/2023          Follow up in about 6 months (around 12/15/2023) for Lab results. In addition to their scheduled follow up, the patient has also been instructed to follow up on as needed basis.

## 2023-06-15 ENCOUNTER — OFFICE VISIT (OUTPATIENT)
Dept: PRIMARY CARE CLINIC | Facility: CLINIC | Age: 68
End: 2023-06-15
Payer: MEDICARE

## 2023-06-15 VITALS
SYSTOLIC BLOOD PRESSURE: 131 MMHG | HEART RATE: 74 BPM | DIASTOLIC BLOOD PRESSURE: 74 MMHG | WEIGHT: 114.31 LBS | OXYGEN SATURATION: 96 % | BODY MASS INDEX: 20.91 KG/M2

## 2023-06-15 DIAGNOSIS — D64.9 NORMOCYTIC ANEMIA: Primary | ICD-10-CM

## 2023-06-15 DIAGNOSIS — L65.9 HAIR LOSS: ICD-10-CM

## 2023-06-15 DIAGNOSIS — M81.0 AGE-RELATED OSTEOPOROSIS WITHOUT CURRENT PATHOLOGICAL FRACTURE: ICD-10-CM

## 2023-06-15 DIAGNOSIS — I10 HYPERTENSION, UNSPECIFIED TYPE: ICD-10-CM

## 2023-06-15 PROCEDURE — 99214 PR OFFICE/OUTPT VISIT, EST, LEVL IV, 30-39 MIN: ICD-10-PCS | Mod: ,,, | Performed by: STUDENT IN AN ORGANIZED HEALTH CARE EDUCATION/TRAINING PROGRAM

## 2023-06-15 PROCEDURE — 99214 OFFICE O/P EST MOD 30 MIN: CPT | Mod: ,,, | Performed by: STUDENT IN AN ORGANIZED HEALTH CARE EDUCATION/TRAINING PROGRAM

## 2023-06-15 RX ORDER — TRIAMTERENE AND HYDROCHLOROTHIAZIDE 37.5; 25 MG/1; MG/1
1 CAPSULE ORAL DAILY
Qty: 90 CAPSULE | Refills: 3 | Status: SHIPPED | OUTPATIENT
Start: 2023-06-15 | End: 2024-06-14

## 2023-07-05 ENCOUNTER — HOSPITAL ENCOUNTER (OUTPATIENT)
Dept: RADIOLOGY | Facility: HOSPITAL | Age: 68
Discharge: HOME | End: 2023-07-05
Attending: STUDENT IN AN ORGANIZED HEALTH CARE EDUCATION/TRAINING PROGRAM
Payer: MEDICARE

## 2023-07-05 DIAGNOSIS — M81.0 AGE-RELATED OSTEOPOROSIS WITHOUT CURRENT PATHOLOGICAL FRACTURE: ICD-10-CM

## 2023-07-05 PROCEDURE — 77080 DXA BONE DENSITY AXIAL: CPT | Mod: 26,,, | Performed by: STUDENT IN AN ORGANIZED HEALTH CARE EDUCATION/TRAINING PROGRAM

## 2023-07-05 PROCEDURE — 77080 DXA BONE DENSITY AXIAL SKELETON 1 OR MORE SITES: ICD-10-PCS | Mod: 26,,, | Performed by: STUDENT IN AN ORGANIZED HEALTH CARE EDUCATION/TRAINING PROGRAM

## 2023-07-05 PROCEDURE — 77080 DXA BONE DENSITY AXIAL: CPT | Mod: TC

## 2023-07-10 ENCOUNTER — TELEPHONE (OUTPATIENT)
Dept: PRIMARY CARE CLINIC | Facility: CLINIC | Age: 68
End: 2023-07-10
Payer: MEDICARE

## 2023-07-10 NOTE — TELEPHONE ENCOUNTER
Pt stated that she has been taking Prolia every six months for the last 4 yrs. She wanted to know if the density had increased or decreased. Please advise. Thank you

## 2023-07-10 NOTE — PROGRESS NOTES
DEXA Results: Please inform patient of DEXA results, which show OSTEOPOROSIS. This is a severe thinning of the bone that places her at a high risk for fractures. If she is willing to begin treatment for this, I would like to initiate Alendronate (Fosamax). This is an oral treatment that needs to be taken once weekly requiring her to sit upright for at least 30 minutes after administration. Please ask if she is ok starting this medication. Repeat DEXA in 2 years to evaluate treatment.       Thank you,    Dr. Disla

## 2023-07-18 NOTE — PROGRESS NOTES
Date: 07/19/2023  Patient ID: [id number]   Chief Complaint: Medicare AWV    HPI:   Prudence Granados is a 67 y.o. female here today for a Medicare Wellness.     Opioid Screening: Patient medication list reviewed, patient is taking prescription opioids. Patient is not using additional opioids than prescribed. Patient is at low risk of substance abuse based on this opioid use history.     Otherwise, patient reports to be doing well. Recent labs showed mildly decreased hemoglobin and increased platelets. Other labs WNL. DEXA scan showed worsening osteoporosis. DEXA 4/5/2019 showed: L spine -2.3,  femur neck -2.9. DEXA scan recently showed worsening with L spine -3.1, femur neck -4.0. Pt has been on Prolia for years but missed February dose due to hospital stay.  Patient also complains of cloudy urine but denies hematuria, dysuria, CVA tenderness, fevers, chills, urinary frequency/urgency.  Wants to get urine checked for bacterial growth.    Diet: good  Activity level: PT 3x/wk.     Patient Active Problem List   Diagnosis    Age-related osteoporosis without current pathological fracture    Constipation    Colostomy status    Spasm of thoracic back muscle    Diverticulitis    Cigarette smoker    Normocytic anemia    Hair loss    Hypertension    Colon polyps    Acute cystitis without hematuria     Outpatient Medications Marked as Taking for the 7/19/23 encounter (Office Visit) with Daysi Disla MD   Medication Sig Dispense Refill    cyanocobalamin 1,000 mcg/mL injection Inject 1 mL (1,000 mcg total) into the muscle every 21 days. 10 mL 3    HYDROcodone-acetaminophen (NORCO)  mg per tablet hydrocodone 10 mg-acetaminophen 325 mg tablet      simvastatin (ZOCOR) 5 MG tablet TAKE 1 TABLET BY MOUTH EVERY DAY AT BEDTIME 90 tablet 3    triamterene-hydrochlorothiazide 37.5-25 mg (DYAZIDE) 37.5-25 mg per capsule Take 1 capsule by mouth once daily. 90 capsule 3    [DISCONTINUED] triamterene-hydrochlorothiazide 37.5-25 mg  "(MAXZIDE-25) 37.5-25 mg per tablet Take 1 tablet by mouth every other day. 30 tablet 3     Past Medical History:   Diagnosis Date    Cerebral aneurysm     Colon polyps 12/14/2022    Darion Jorgensen MD    HTN (hypertension)     Osteoporosis     Vitamin D deficiency      Past Surgical History:   Procedure Laterality Date    COLONOSCOPY  12/14/2022    Darion Jorgensen MD    COLOSTOMY  08/10/2022    colostomy reversal      ruptured colon      TOTAL HIP ARTHROPLASTY      TUBAL LIGATION      WISDOM TOOTH EXTRACTION       Review of patient's allergies indicates:   Allergen Reactions    Azithromycin      Other reaction(s): wakes up, wakes up "freaking out"    Codeine Itching    Cortisone      Other reaction(s): feels like she isnt breathing     History reviewed. No pertinent family history.   Social History     Socioeconomic History    Marital status: Single   Tobacco Use    Smoking status: Every Day     Packs/day: 1.00     Types: Cigarettes    Smokeless tobacco: Never   Substance and Sexual Activity    Alcohol use: Never    Drug use: Never    Sexual activity: Yes     Patient Care Team:  Daysi Disla MD as PCP - General (Family Medicine)  Bethanie Estes LPN as Care Coordinator  Darion Jorgensen MD as Consulting Physician (Colon and Rectal Surgery)   Subjective:   Review of Systems   Constitutional:  Negative for fever and weight loss.   HENT:  Negative for congestion, hearing loss and sore throat.    Eyes:  Negative for blurred vision and double vision.   Respiratory:  Negative for cough and shortness of breath.    Cardiovascular:  Negative for chest pain and palpitations.   Gastrointestinal:  Negative for abdominal pain and diarrhea.   Genitourinary:  Negative for dysuria, frequency, hematuria and urgency.   Musculoskeletal:  Negative for falls.   Skin:  Negative for rash.   Psychiatric/Behavioral:  Negative for depression and memory loss. The patient is not nervous/anxious and does not have insomnia.    See " HPI for details  All Other ROS: Negative except as stated in HPI  Patient Reported Health Risk Assessment  What [age] 65-69  Are you male or female?: Female  During the past four weeks, how much have you been bothered by emotional problems such as feeling anxious, depressed, irritable, sad, or downhearted and blue?: Not at all  During the past five weeks, has your physical and/or emotional health limited your social activities with family, friends, neighbors, or groups?: Not at all  During the past four weeks, how much bodily pain have you generally had?: Very mild pain  During the past four weeks, was someone available to help if you needed and wanted help?: Yes, as much as I wanted  During the past four weeks, what was the hardest physical activity you could do for at least two minutes?: Light  Can you get to places out of walking distance without help?  (For example, can you travel alone on buses or taxis, or drive your own car?): Yes  Can you go shopping for groceries or clothes without someone's help?: Yes  Can you prepare your own meals?: Yes  Can you do your own housework without help?: Yes  Because of any health problems, do you need the help of another person with your personal care needs such as eating, bathing, dressing, or getting around the house?: No  Can you handle your own money without help?: Yes  During the past four weeks, how would you rate your health in general?: Good  How have things been going for you during the past four weeks?: Pretty well  Are you having difficulties driving your car?: No  Do you always fasten your seat belt when you are in a car?: Yes, usually  How often in the past four weeks have you been bothered by falling or dizzy when standing up?: Seldom  How often in the past four weeks have you been bothered by sexual problems?: Never  How often in the past four weeks have you been bothered by trouble eating well?: Never  How often in the past four weeks have you been  bothered by teeth or denture problems?: Never  How often in the past four weeks have you been bothered with problems using the telephone?: Never  How often in the past four weeks have you been bothered by tiredness or fatigue?: Often  Have you fallen two or more times in the past year?: No  Are you afraid of falling?: No  Are you a smoker?: Yes, I'm not ready to quit  During the past four weeks, how many drinks of wine, beer, or other alcoholic beverages did you have?: No alcohol at all  Do you exercise for about 20 minutes three or more days a week?: Yes, most of the time  Have you been given any information to help you with hazards in your house that might hurt you?: Yes  Have you been given any information to help you with keeping track of your medications?: Yes  How often do you have trouble taking medicines the way you've been told to take them?: I always take them as prescribed  How confident are you that you can control and manage most of your health problems?: Very confident  What is your race? (Check all that apply.):   Objective:   /70   Pulse 98   Wt 53.1 kg (117 lb 1.6 oz)   SpO2 (!) 92%   BMI 21.42 kg/m²   Physical Exam  Vitals reviewed.   Constitutional:       Appearance: Normal appearance.   HENT:      Head: Normocephalic and atraumatic.      Right Ear: Tympanic membrane, ear canal and external ear normal.      Left Ear: Tympanic membrane, ear canal and external ear normal.      Nose: Nose normal. No congestion or rhinorrhea.      Mouth/Throat:      Mouth: Mucous membranes are moist.      Pharynx: Oropharynx is clear.   Eyes:      General: No scleral icterus.     Extraocular Movements: Extraocular movements intact.      Conjunctiva/sclera: Conjunctivae normal.   Cardiovascular:      Rate and Rhythm: Normal rate and regular rhythm.      Pulses: Normal pulses.      Heart sounds: Normal heart sounds.   Pulmonary:      Effort: Pulmonary effort is normal.      Breath sounds: Normal  breath sounds.   Abdominal:      General: Abdomen is flat. Bowel sounds are normal.      Palpations: Abdomen is soft.      Tenderness: There is no abdominal tenderness.   Musculoskeletal:         General: No swelling or deformity. Normal range of motion.      Cervical back: Normal range of motion and neck supple.   Skin:     General: Skin is warm and dry.      Comments: Scars on abdomen.  1 cm subcutaneous cyst/nodules x2 on right cheek and left neck-patient states dermatology will remove this year   Neurological:      Mental Status: She is alert and oriented to person, place, and time.   Psychiatric:         Mood and Affect: Mood normal.         Behavior: Behavior normal.         Thought Content: Thought content normal.         Judgment: Judgment normal.     No flowsheet data found.  Fall Risk Assessment - Outpatient 7/19/2023 6/15/2023 3/6/2023 1/5/2023 8/2/2022 7/18/2022   Mobility Status Ambulatory w/ assistance Ambulatory w/ assistance Ambulatory Ambulatory Ambulatory Ambulatory   Number of falls 0 1 0 1 0 1   Identified as fall risk 1 1 0 0 0 0           Depression Screening  Over the past two weeks, has the patient felt down, depressed, or hopeless?: No  Over the past two weeks, has the patient felt little interest or pleasure in doing things?: No  Functional Ability/Safety Screening  Was the patient's timed Up & Go test unsteady or longer than 30 seconds?: No  Does the patient need help with phone, transportation, shopping, preparing meals, housework, laundry, meds, or managing money?: No  Does the patient's home have rugs in the hallway, lack grab bars in the bathroom, lack handrails on the stairs or have poor lighting?: No  Have you noticed any hearing difficulties?: No  Cognitive Function (Assessed through direct observation with due consideration of information obtained by way of patient reports and/or concerns raised by family, friends, caretakers, or others)    Does the patient repeat  questions/statements in the same day?: No  Does the patient have trouble remembering the date, year, and time?: No  Does the patient have difficulty managing finances?: No  Does the patient have a decreased sense of direction?: No  Assessment:       ICD-10-CM ICD-9-CM   1. Normocytic anemia  D64.9 285.9   2. Acute cystitis without hematuria  N30.00 595.0   3. Age-related osteoporosis without current pathological fracture  M81.0 733.01      Plan:   1. Normocytic anemia  Overview:  Improving  Asymptomatic  Continue to monitor with routine labs    Orders:  -     CBC Auto Differential; Future; Expected date: 07/19/2023    2. Acute cystitis without hematuria  Overview:  UA and UCx  Report any continuing/worsening symptoms after antibiotic completion such as nausea/vomiting, low back pain, blood in urine, burning with urination, or fever/body aches/chills, etc.  Drink plenty of water daily. Avoid soda.  Hygiene discussed:  -Increase hydration  -Wipe from front to back  -Cotton underwear  -Avoid douching  -Empty bladder before and after intercourse  -Urinate frequently. Do not hold urine for extended periods of time.              Orders:  -     Urinalysis  -     Urine culture    3. Age-related osteoporosis without current pathological fracture  Overview:  Going to PT  Consider referral to endocrinologist       Orders:  -     Ambulatory referral/consult to Endocrinology; Future; Expected date: 07/26/2023         Medicare Annual Wellness and Personalized Prevention Plan:   Fall Risk + Home Safety + Hearing Impairment + Depression Screen + Opioid and Substance Abuse Screening + Cognitive Impairment Screen + Health Risk Assessment all reviewed.     Health Maintenance Topics with due status: Not Due       Topic Last Completion Date    Mammogram 10/05/2022    Influenza Vaccine 10/13/2022    Colorectal Cancer Screening 12/14/2022    Lipid Panel 06/06/2023    DEXA Scan 07/05/2023      The patient's Health Maintenance was reviewed  and the following appears to be due at this time:   Health Maintenance Due   Topic Date Due    Shingles Vaccine (2 of 2) 03/15/2021    COVID-19 Vaccine (6 - Mixed Product series) 03/17/2022    TETANUS VACCINE  06/21/2023       Advance Care Planning     Date: 07/18/2023  In future       Follow up in about 6 months (around 1/19/2024) for CBC, anemia. In addition to their scheduled follow up, the patient has also been instructed to follow up on as needed basis.

## 2023-07-19 ENCOUNTER — OFFICE VISIT (OUTPATIENT)
Dept: PRIMARY CARE CLINIC | Facility: CLINIC | Age: 68
End: 2023-07-19
Payer: MEDICARE

## 2023-07-19 ENCOUNTER — PATIENT OUTREACH (OUTPATIENT)
Dept: ADMINISTRATIVE | Facility: HOSPITAL | Age: 68
End: 2023-07-19
Payer: MEDICARE

## 2023-07-19 VITALS
OXYGEN SATURATION: 92 % | DIASTOLIC BLOOD PRESSURE: 70 MMHG | HEART RATE: 98 BPM | SYSTOLIC BLOOD PRESSURE: 117 MMHG | BODY MASS INDEX: 21.42 KG/M2 | WEIGHT: 117.13 LBS

## 2023-07-19 DIAGNOSIS — D64.9 NORMOCYTIC ANEMIA: Primary | ICD-10-CM

## 2023-07-19 DIAGNOSIS — N30.00 ACUTE CYSTITIS WITHOUT HEMATURIA: ICD-10-CM

## 2023-07-19 DIAGNOSIS — M81.0 AGE-RELATED OSTEOPOROSIS WITHOUT CURRENT PATHOLOGICAL FRACTURE: ICD-10-CM

## 2023-07-19 PROBLEM — K63.5 COLON POLYPS: Status: ACTIVE | Noted: 2022-12-14

## 2023-07-19 LAB
APPEARANCE UR: ABNORMAL
BACTERIA #/AREA URNS AUTO: ABNORMAL /HPF
BILIRUB UR QL STRIP.AUTO: NEGATIVE
COLOR UR: YELLOW
GLUCOSE UR QL STRIP.AUTO: NEGATIVE
KETONES UR QL STRIP.AUTO: ABNORMAL
LEUKOCYTE ESTERASE UR QL STRIP.AUTO: ABNORMAL
NITRITE UR QL STRIP.AUTO: NEGATIVE
PH UR STRIP.AUTO: 5.5 [PH]
PROT UR QL STRIP.AUTO: ABNORMAL
RBC #/AREA URNS AUTO: <5 /HPF
RBC UR QL AUTO: NEGATIVE
SP GR UR STRIP.AUTO: 1.02 (ref 1–1.03)
SQUAMOUS #/AREA URNS AUTO: <5 /HPF
UROBILINOGEN UR STRIP-ACNC: 0.2
WBC #/AREA URNS AUTO: 6 /HPF

## 2023-07-19 PROCEDURE — G0439 PPPS, SUBSEQ VISIT: HCPCS | Mod: ,,, | Performed by: STUDENT IN AN ORGANIZED HEALTH CARE EDUCATION/TRAINING PROGRAM

## 2023-07-19 PROCEDURE — 99213 OFFICE O/P EST LOW 20 MIN: CPT | Mod: 25,,, | Performed by: STUDENT IN AN ORGANIZED HEALTH CARE EDUCATION/TRAINING PROGRAM

## 2023-07-19 PROCEDURE — 99213 PR OFFICE/OUTPT VISIT, EST, LEVL III, 20-29 MIN: ICD-10-PCS | Mod: 25,,, | Performed by: STUDENT IN AN ORGANIZED HEALTH CARE EDUCATION/TRAINING PROGRAM

## 2023-07-19 PROCEDURE — G0439 PR MEDICARE ANNUAL WELLNESS SUBSEQUENT VISIT: ICD-10-PCS | Mod: ,,, | Performed by: STUDENT IN AN ORGANIZED HEALTH CARE EDUCATION/TRAINING PROGRAM

## 2023-07-19 NOTE — PROGRESS NOTES
The following record(s)  below were uploaded for Health Maintenance .    COLONOSCOPY /Polypectomy    2022

## 2023-08-10 ENCOUNTER — TELEPHONE (OUTPATIENT)
Dept: PRIMARY CARE CLINIC | Facility: CLINIC | Age: 68
End: 2023-08-10
Payer: MEDICARE

## 2023-08-10 NOTE — TELEPHONE ENCOUNTER
----- Message from Socorro Jenkins sent at 8/10/2023  3:31 PM CDT -----  Regarding: Patient Return call  .Type:  Patient Returning Call    Who Called:pt  Who Left Message for Patient:office staff  Does the patient know what this is regarding?:referral   Would the patient rather a call back or a response via [facility]?   Best Call Back Number:[phone]  Additional Information: pt states an referral was sent to Dr Say Weems and she haven't heard from the office please advise

## 2023-08-29 ENCOUNTER — TELEPHONE (OUTPATIENT)
Dept: PRIMARY CARE CLINIC | Facility: CLINIC | Age: 68
End: 2023-08-29
Payer: MEDICARE

## 2023-08-29 NOTE — TELEPHONE ENCOUNTER
Face sheet faxed to Dr. Weems's office.----- Message from Roxanna Madrid sent at 8/29/2023  2:53 PM CDT -----  Regarding: insurance quest for referral  Type:  Needs Medical Advice    Who Called: pt    Would the patient rather a call back or a response via [facility]? C/b  Best Call Back Number: [phone]    Additional Information: pt wants clinic to contact Dr's office, endocrinologist that dr lu referred pt to so that clinic can straighten out insurance issue.  Pt states and our chart shows Medicare is primary & BC/BS is secondary  pt endo office states insurance is other way around and they won't accept pt with BC/BS as primary which it is not.

## 2023-09-06 ENCOUNTER — TELEPHONE (OUTPATIENT)
Dept: PRIMARY CARE CLINIC | Facility: CLINIC | Age: 68
End: 2023-09-06
Payer: MEDICARE

## 2023-09-06 NOTE — TELEPHONE ENCOUNTER
I will send to someone else.----- Message from [facility] sent at 9/6/2023 11:45 AM CDT -----  .Type:  Needs Medical Advice    Who Called:  Dr. Duong    Additional Information:  Dr Duong is not accepting new patient please refer pt some where else

## 2023-11-14 ENCOUNTER — TELEPHONE (OUTPATIENT)
Dept: PRIMARY CARE CLINIC | Facility: CLINIC | Age: 68
End: 2023-11-14
Payer: MEDICARE

## 2023-11-14 DIAGNOSIS — Z12.31 BREAST CANCER SCREENING BY MAMMOGRAM: Primary | ICD-10-CM

## 2023-12-05 ENCOUNTER — HOSPITAL ENCOUNTER (OUTPATIENT)
Dept: RADIOLOGY | Facility: HOSPITAL | Age: 68
Discharge: HOME | End: 2023-12-05
Attending: STUDENT IN AN ORGANIZED HEALTH CARE EDUCATION/TRAINING PROGRAM
Payer: MEDICARE

## 2023-12-05 DIAGNOSIS — Z12.31 BREAST CANCER SCREENING BY MAMMOGRAM: ICD-10-CM

## 2023-12-05 PROCEDURE — 77063 MAMMO DIGITAL SCREENING BILAT WITH TOMO: ICD-10-PCS | Mod: 26,,, | Performed by: STUDENT IN AN ORGANIZED HEALTH CARE EDUCATION/TRAINING PROGRAM

## 2023-12-05 PROCEDURE — 77067 SCR MAMMO BI INCL CAD: CPT | Mod: TC

## 2023-12-05 PROCEDURE — 77067 MAMMO DIGITAL SCREENING BILAT WITH TOMO: ICD-10-PCS | Mod: 26,,, | Performed by: STUDENT IN AN ORGANIZED HEALTH CARE EDUCATION/TRAINING PROGRAM

## 2023-12-05 PROCEDURE — 77063 BREAST TOMOSYNTHESIS BI: CPT | Mod: 26,,, | Performed by: STUDENT IN AN ORGANIZED HEALTH CARE EDUCATION/TRAINING PROGRAM

## 2023-12-05 PROCEDURE — 77067 SCR MAMMO BI INCL CAD: CPT | Mod: 26,,, | Performed by: STUDENT IN AN ORGANIZED HEALTH CARE EDUCATION/TRAINING PROGRAM

## 2023-12-07 NOTE — PROGRESS NOTES
Normal MMG. Repeat in 1 year. However, there chronic right breast silicone implant rupture. Recommendation is to have consultation with plastic surgeon. Please ask pt if she has preference/would like to proceed         Thank you,    Dr. Disla

## 2023-12-29 DIAGNOSIS — E78.5 HYPERLIPIDEMIA, UNSPECIFIED HYPERLIPIDEMIA TYPE: ICD-10-CM

## 2023-12-29 RX ORDER — SIMVASTATIN 5 MG/1
5 TABLET, FILM COATED ORAL NIGHTLY
Qty: 90 TABLET | Refills: 3 | Status: SHIPPED | OUTPATIENT
Start: 2023-12-29

## 2024-01-16 ENCOUNTER — TELEPHONE (OUTPATIENT)
Dept: PRIMARY CARE CLINIC | Facility: CLINIC | Age: 69
End: 2024-01-16
Payer: MEDICARE

## 2024-01-16 DIAGNOSIS — R53.83 FATIGUE, UNSPECIFIED TYPE: ICD-10-CM

## 2024-01-16 RX ORDER — CYANOCOBALAMIN 1000 UG/ML
1000 INJECTION, SOLUTION INTRAMUSCULAR; SUBCUTANEOUS
Qty: 10 ML | Refills: 3 | Status: SHIPPED | OUTPATIENT
Start: 2024-01-16

## 2024-01-16 NOTE — TELEPHONE ENCOUNTER
----- Message from Kamala Pierre sent at 1/16/2024 12:32 PM CST -----  .Type:  RX Refill Request    Who Called: pt  Refill or New Rx:refill  RX Name and Strength:cyanocobalamin 1,000 mcg/mL injection  How is the patient currently taking it? (ex. 1XDay):10 ml  Is this a 30 day or 90 day RX:90  Preferred Pharmacy with phone number:[id number] [place], [place] - [address] AT [facility]  Local or Mail Order:local  Ordering Provider:  Would the patient rather a call back or a response via [facility]?   Best Call Back Number:[phone]  Additional Information: refill request

## 2024-01-16 NOTE — TELEPHONE ENCOUNTER
----- Message from Ramo Taylor sent at 1/16/2024  3:15 PM CST -----  .Type:  RX Refill Request    Who Called: Prudence   Refill or New Rx: Refill   RX Name and Strength: She needs the cyanocopalamin 1000 Mg. That is what should have been called in for her.   How is the patient currently taking it? (ex. 1XDay):  Is this a 30 day or 90 day RX:  Preferred Pharmacy with phone number: [facility] in [place]   Local or Mail Order: Local   Ordering Provider: Naif  Would the patient rather a call back or a response via [facility]?   Best Call Back Number: [phone]  Additional Information: Please call her back to make sure this is corrected.

## 2024-01-17 ENCOUNTER — LAB VISIT (OUTPATIENT)
Dept: LAB | Facility: HOSPITAL | Age: 69
End: 2024-01-17
Attending: STUDENT IN AN ORGANIZED HEALTH CARE EDUCATION/TRAINING PROGRAM
Payer: MEDICARE

## 2024-01-17 DIAGNOSIS — I10 HYPERTENSION, UNSPECIFIED TYPE: ICD-10-CM

## 2024-01-17 DIAGNOSIS — I10 HYPERTENSION, UNSPECIFIED TYPE: Primary | ICD-10-CM

## 2024-01-17 LAB
BASOPHILS # BLD AUTO: 0.07 X10(3)/MCL
BASOPHILS NFR BLD AUTO: 0.7 %
EOSINOPHIL # BLD AUTO: 0.1 X10(3)/MCL (ref 0–0.9)
EOSINOPHIL NFR BLD AUTO: 1 %
ERYTHROCYTE [DISTWIDTH] IN BLOOD BY AUTOMATED COUNT: 13.7 % (ref 11.5–17)
HCT VFR BLD AUTO: 45.6 % (ref 37–47)
HGB BLD-MCNC: 14.6 G/DL (ref 12–16)
IMM GRANULOCYTES # BLD AUTO: 0.09 X10(3)/MCL (ref 0–0.04)
IMM GRANULOCYTES NFR BLD AUTO: 0.9 %
LYMPHOCYTES # BLD AUTO: 1.77 X10(3)/MCL (ref 0.6–4.6)
LYMPHOCYTES NFR BLD AUTO: 17.1 %
MCH RBC QN AUTO: 27.9 PG (ref 27–31)
MCHC RBC AUTO-ENTMCNC: 32 G/DL (ref 33–36)
MCV RBC AUTO: 87.2 FL (ref 80–94)
MONOCYTES # BLD AUTO: 0.51 X10(3)/MCL (ref 0.1–1.3)
MONOCYTES NFR BLD AUTO: 4.9 %
NEUTROPHILS # BLD AUTO: 7.82 X10(3)/MCL (ref 2.1–9.2)
NEUTROPHILS NFR BLD AUTO: 75.4 %
NRBC BLD AUTO-RTO: 0 %
PLATELET # BLD AUTO: 358 X10(3)/MCL (ref 130–400)
PMV BLD AUTO: 9.3 FL (ref 7.4–10.4)
RBC # BLD AUTO: 5.23 X10(6)/MCL (ref 4.2–5.4)
WBC # SPEC AUTO: 10.36 X10(3)/MCL (ref 4.5–11.5)

## 2024-01-17 PROCEDURE — 85025 COMPLETE CBC W/AUTO DIFF WBC: CPT

## 2024-01-17 PROCEDURE — 36415 COLL VENOUS BLD VENIPUNCTURE: CPT

## 2024-01-18 NOTE — PROGRESS NOTES
Date: 01/23/2024  Patient ID: [id number]   Chief Complaint: Follow-up (With labs)    HPI:   Prudence Granados is a 68 y.o. female here today for Follow-up (With labs)  Recent CBC wnl  Pt fell on ice 1 wk ago and landed on butt. Now has SCM pain that is improving with heating pad. She f/u with orthopedics Dr. Wagner and PT. Was given Robaxin and Prednisone, which pt hasn't started yet.  Patient also has Evenity injection planned this week.   Patient Active Problem List   Diagnosis    Age-related osteoporosis without current pathological fracture    Constipation    Colostomy status    Spasm of thoracic back muscle    Diverticulitis    Cigarette smoker    Normocytic anemia    Hair loss    Hypertension    Colon polyps    Acute cystitis without hematuria    Sternocleidomastoid muscle tenderness     Outpatient Medications Marked as Taking for the 1/23/24 encounter (Office Visit) with Daysi Disla MD   Medication Sig Dispense Refill    cyanocobalamin 1,000 mcg/mL injection Inject 1 mL (1,000 mcg total) into the muscle every 21 days. 10 mL 3    methocarbamoL (ROBAXIN) 500 MG Tab Take 500 mg by mouth.      pravastatin (PRAVACHOL) 20 MG tablet       predniSONE (DELTASONE) 20 MG tablet Take 20 mg by mouth.      simvastatin (ZOCOR) 5 MG tablet Take 1 tablet (5 mg total) by mouth every evening. 90 tablet 3    triamterene-hydrochlorothiazide 37.5-25 mg (DYAZIDE) 37.5-25 mg per capsule Take 1 capsule by mouth once daily. 90 capsule 3     Past Medical History:   Diagnosis Date    Cerebral aneurysm     Colon polyps 12/14/2022    Darion Jorgensen MD    HTN (hypertension)     Osteoporosis     Vitamin D deficiency      Past Surgical History:   Procedure Laterality Date    COLONOSCOPY  12/14/2022    Darion Jorgensen MD    COLOSTOMY  08/10/2022    colostomy reversal      ruptured colon      TOTAL HIP ARTHROPLASTY      TUBAL LIGATION      WISDOM TOOTH EXTRACTION       Review of patient's allergies indicates:   Allergen Reactions     "Azithromycin      Other reaction(s): wakes up, wakes up "freaking out"    Codeine Itching    Cortisone      Other reaction(s): feels like she isnt breathing     History reviewed. No pertinent family history.   Social History     Socioeconomic History    Marital status: Single   Tobacco Use    Smoking status: Every Day     Current packs/day: 1.00     Types: Cigarettes    Smokeless tobacco: Never   Substance and Sexual Activity    Alcohol use: Never    Drug use: Never    Sexual activity: Yes     Patient Care Team:  Daysi Disla MD as PCP - General (Family Medicine)  Bethanie Estes LPN as Care Coordinator  Darion Jorgensen MD as Consulting Physician (Colon and Rectal Surgery)   Subjective:   ROS  See HPI for details  All Other ROS: Negative except as stated in HPI.   Objective:   /80   Pulse 83   Temp 98.5 °F (36.9 °C)   Ht 5' 2" (1.575 m)   Wt 60.7 kg (133 lb 14.4 oz)   SpO2 95%   BMI 24.49 kg/m²   Physical Exam  Constitutional:       General: She is not in acute distress.  Neck:      Comments: SCM anteriorly TTP but no swelling/erythema  Cardiovascular:      Rate and Rhythm: Normal rate and regular rhythm.      Heart sounds: Normal heart sounds.   Pulmonary:      Effort: Pulmonary effort is normal.   Neurological:      Mental Status: She is alert and oriented to person, place, and time.       Assessment:       ICD-10-CM ICD-9-CM   1. Normocytic anemia  D64.9 285.9   2. Wellness examination  Z00.00 V70.0   3. Primary hypertension  I10 401.9   4. Sternocleidomastoid muscle tenderness  M79.12 729.1      Plan:   1. Normocytic anemia  Assessment & Plan:  Resolved  Continue monitoring    Orders:  -     CBC Auto Differential; Future; Expected date: 07/23/2024    2. Wellness examination  -     CBC Auto Differential; Future; Expected date: 07/23/2024  -     Comprehensive Metabolic Panel; Future; Expected date: 07/23/2024  -     Lipid Panel; Future; Expected date: 07/23/2024  -     TSH; Future; " Expected date: 07/23/2024  -     Hemoglobin A1C; Future; Expected date: 07/23/2024  -     Urinalysis; Future; Expected date: 07/23/2024  -     Vitamin D; Future; Expected date: 07/23/2024    3. Primary hypertension  Overview:  Blood pressure goal <140/90 (<130/80 if otherwise noted), recommend DASH diet, record BP at home daily and bring log to next office visit, assure that home cuff is calibrated at minimum every 12 months, continue current medication regimen.      Orders:  -     CBC Auto Differential; Future; Expected date: 07/23/2024  -     Comprehensive Metabolic Panel; Future; Expected date: 07/23/2024  -     Lipid Panel; Future; Expected date: 07/23/2024  -     TSH; Future; Expected date: 07/23/2024  -     Hemoglobin A1C; Future; Expected date: 07/23/2024  -     Urinalysis; Future; Expected date: 07/23/2024  -     Vitamin D; Future; Expected date: 07/23/2024    4. Sternocleidomastoid muscle tenderness  Assessment & Plan:  Continue heat, OTC NSAIDs and Robaxin prn  Would avoid Prednisone during Evenity injection due to potential risks           Medication List with Changes/Refills   Current Medications    CYANOCOBALAMIN 1,000 MCG/ML INJECTION    Inject 1 mL (1,000 mcg total) into the muscle every 21 days.       Start Date: 1/16/2024 End Date: --    HYDROCODONE-ACETAMINOPHEN (NORCO)  MG PER TABLET    hydrocodone 10 mg-acetaminophen 325 mg tablet       Start Date: --        End Date: --    METHOCARBAMOL (ROBAXIN) 500 MG TAB    Take 500 mg by mouth.       Start Date: 1/22/2024 End Date: --    PRAVASTATIN (PRAVACHOL) 20 MG TABLET           Start Date: --        End Date: --    PREDNISONE (DELTASONE) 20 MG TABLET    Take 20 mg by mouth.       Start Date: --        End Date: --    SIMVASTATIN (ZOCOR) 5 MG TABLET    Take 1 tablet (5 mg total) by mouth every evening.       Start Date: 12/29/2023End Date: --    TRIAMTERENE-HYDROCHLOROTHIAZIDE 37.5-25 MG (DYAZIDE) 37.5-25 MG PER CAPSULE    Take 1 capsule by mouth  once daily.       Start Date: 6/15/2023 End Date: 6/14/2024        Follow up in about 6 months (around 7/23/2024) for Medicare Wellness. In addition to their scheduled follow up, the patient has also been instructed to follow up on as needed basis.

## 2024-01-23 ENCOUNTER — OFFICE VISIT (OUTPATIENT)
Dept: PRIMARY CARE CLINIC | Facility: CLINIC | Age: 69
End: 2024-01-23
Payer: MEDICARE

## 2024-01-23 VITALS
OXYGEN SATURATION: 95 % | HEART RATE: 83 BPM | WEIGHT: 133.88 LBS | HEIGHT: 62 IN | SYSTOLIC BLOOD PRESSURE: 130 MMHG | BODY MASS INDEX: 24.64 KG/M2 | TEMPERATURE: 99 F | DIASTOLIC BLOOD PRESSURE: 80 MMHG

## 2024-01-23 DIAGNOSIS — D64.9 NORMOCYTIC ANEMIA: Primary | ICD-10-CM

## 2024-01-23 DIAGNOSIS — I10 PRIMARY HYPERTENSION: ICD-10-CM

## 2024-01-23 DIAGNOSIS — M79.12 STERNOCLEIDOMASTOID MUSCLE TENDERNESS: ICD-10-CM

## 2024-01-23 DIAGNOSIS — Z00.00 WELLNESS EXAMINATION: ICD-10-CM

## 2024-01-23 PROCEDURE — 99213 OFFICE O/P EST LOW 20 MIN: CPT | Mod: ,,, | Performed by: STUDENT IN AN ORGANIZED HEALTH CARE EDUCATION/TRAINING PROGRAM

## 2024-01-23 RX ORDER — METHOCARBAMOL 500 MG/1
500 TABLET, FILM COATED ORAL
COMMUNITY
Start: 2024-01-22

## 2024-01-23 RX ORDER — PREDNISONE 20 MG/1
20 TABLET ORAL
COMMUNITY

## 2024-01-23 RX ORDER — PRAVASTATIN SODIUM 20 MG/1
TABLET ORAL
COMMUNITY

## 2024-01-23 NOTE — ASSESSMENT & PLAN NOTE
Continue heat, OTC NSAIDs and Robaxin prn  Would avoid Prednisone during Evenity injection due to potential risks

## 2024-04-25 LAB
ALBUMIN/GLOB SERPL: 1.8 {RATIO}
ALBUMIN: 4.4
ALP ISOS SERPL LEV INH-CCNC: 162 U/L
ALT SERPL-CCNC: 23 UNIT/L
ANION GAP SERPL CALC-SCNC: NORMAL MMOL/L
AST: 21
BILIRUB SERPL-MCNC: 0.2 MG/DL (ref 0.1–1.4)
BILIRUBIN DIRECT+TOT PNL SERPL-MCNC: 0.1 MG/DL (ref 0.01–0.4)
BUN SERPL-MCNC: 23 MG/DL
BUN SERPL-MCNC: NORMAL MG/DL
BUN/CREAT SERPL: NORMAL
CALCIUM SERPL-MCNC: 9.7 MG/DL
CHLORIDE SERPL-SCNC: 105 MMOL/L (ref 99–108)
CK-BB: 50
CO2 SERPL-SCNC: 28 MMOL/L
CO2 SERPL-SCNC: NORMAL MMOL/L
CREAT SERPL-MCNC: 0.9 MG/DL
EGFR: 65.1
GLOBULIN SER-MCNC: 2.4 G/DL
GLUCOSE SERPL-MCNC: 87 MG/DL
IRON: NORMAL
MAGNESIUM SERPL-MCNC: NORMAL MG/DL
PHOSPHATE FLD-MCNC: 3.8 MG/DL (ref 2.5–4.9)
POTASSIUM SERPL-SCNC: 4.7 MMOL/L (ref 3.4–5.3)
PROTEIN TOTAL: 6.8
SODIUM BLD-SCNC: 142 MMOL/L (ref 137–147)
URATE SERPL-MCNC: 7.6 MG/DL

## 2024-06-25 ENCOUNTER — TELEPHONE (OUTPATIENT)
Dept: PRIMARY CARE CLINIC | Facility: CLINIC | Age: 69
End: 2024-06-25
Payer: MEDICARE

## 2024-06-25 DIAGNOSIS — I10 HYPERTENSION, UNSPECIFIED TYPE: ICD-10-CM

## 2024-06-25 RX ORDER — TRIAMTERENE AND HYDROCHLOROTHIAZIDE 37.5; 25 MG/1; MG/1
1 CAPSULE ORAL DAILY
Qty: 90 CAPSULE | Refills: 3 | Status: SHIPPED | OUTPATIENT
Start: 2024-06-25 | End: 2025-06-25

## 2024-06-25 NOTE — TELEPHONE ENCOUNTER
----- Message from Isidro Hoyos sent at 6/25/2024  3:18 PM CDT -----  Regarding: med refill  Who Called: Prudence Granados    Refill or New Rx:Refill  RX Name and Strength:triamterene-hydrochlorothiazide 37.5-25 mg (DYAZIDE) 37.5-25 mg per capsule  How is the patient currently taking it? (ex. 1XDay):1x  Is this a 30 day or 90 day RX:90  Local or Mail Order:local  List of preferred pharmacies on file (remove unneeded): [id number] - [address] AT [facility]   Phone: [phone]  Fax: [phone]        If different Pharmacy is requested, enter Pharmacy information here including location and phone number:    Ordering Provider:      Preferred Method of Contact: Phone Call  Patient's Preferred Phone Number on File: [phone]   Best Call Back Number, if different:  Additional Information: Pt stated pharmacy sent a message refill for this medication was denied, please advise.

## 2024-07-16 ENCOUNTER — TELEPHONE (OUTPATIENT)
Dept: PRIMARY CARE CLINIC | Facility: CLINIC | Age: 69
End: 2024-07-16
Payer: MEDICARE

## 2024-07-16 NOTE — PROGRESS NOTES
Date: 07/23/2024  Patient ID: [id number]   Chief Complaint: Medicare AWV    HPI:   Prudence Granados is a 68 y.o. female here today for a Medicare Wellness.     Opioid Screening: Patient medication list reviewed, patient is not taking prescription opioids. Patient is not using additional opioids than prescribed. Patient is at low risk of substance abuse based on this opioid use history.     Otherwise, patient reports to be doing well. Has been getting Evenity injections until December  Labs wnl    Diet: tries to eat healthy  Activity level: goes to PT 2x/wk and walks daily   Cognition: answering questions appropriately and following conversation without issues. No sign of cognitive decline during this exam  Health Maintenance Topics with due status: Not Due       Topic Last Completion Date    Colorectal Cancer Screening 12/14/2022    DEXA Scan 07/05/2023    Influenza Vaccine 10/16/2023    Mammogram 12/07/2023    Hemoglobin A1c (Diabetic Prevention Screening) 07/18/2024    Lipid Panel 07/18/2024         Patient Active Problem List   Diagnosis    Age-related osteoporosis without current pathological fracture    Wellness examination    Constipation    Spasm of thoracic back muscle    Diverticulitis    Cigarette smoker    Hair loss    Hypertension    Colon polyps    Sternocleidomastoid muscle tenderness    Peripheral vascular disease, unspecified    Right ear impacted cerumen     Outpatient Medications Marked as Taking for the 7/23/24 encounter (Office Visit) with Daysi Disla MD   Medication Sig Dispense Refill    cyanocobalamin 1,000 mcg/mL injection Inject 1 mL (1,000 mcg total) into the muscle every 21 days. 10 mL 3    simvastatin (ZOCOR) 5 MG tablet Take 1 tablet (5 mg total) by mouth every evening. 90 tablet 3    triamterene-hydrochlorothiazide 37.5-25 mg (DYAZIDE) 37.5-25 mg per capsule Take 1 capsule by mouth once daily. 90 capsule 3    [DISCONTINUED] methocarbamoL (ROBAXIN) 500 MG Tab Take 500 mg by mouth.       "[DISCONTINUED] pravastatin (PRAVACHOL) 20 MG tablet        Past Medical History:   Diagnosis Date    Acute cystitis without hematuria 07/19/2023    UA and UCx  Report any continuing/worsening symptoms after antibiotic completion such as nausea/vomiting, low back pain, blood in urine, burning with urination, or fever/body aches/chills, etc.  Drink plenty of water daily. Avoid soda.  Hygiene discussed:  -Increase hydration  -Wipe from front to back  -Cotton underwear  -Avoid douching  -Empty bladder before and after intercourse  -Urinate freque    Cerebral aneurysm     Colon polyps 12/14/2022    Darion Jorgensen MD    HTN (hypertension)     Normocytic anemia 06/14/2023    Osteoporosis     Vitamin D deficiency      Past Surgical History:   Procedure Laterality Date    BRAIN SURGERY  09/30/2003    BREAST SURGERY  1974    COLON SURGERY  2022, 2023    COLONOSCOPY  12/14/2022    Darion Jorgensen MD    COLOSTOMY  08/10/2022    colostomy reversal      JOINT REPLACEMENT  2010    ruptured colon      TOTAL HIP ARTHROPLASTY      TUBAL LIGATION      WISDOM TOOTH EXTRACTION       Review of patient's allergies indicates:   Allergen Reactions    Azithromycin      Other reaction(s): wakes up, wakes up "freaking out"    Codeine Itching    Cortisone      Other reaction(s): feels like she isnt breathing     Family History   Problem Relation Name Age of Onset    Hypertension Mother Mireya chaidez       Social History     Socioeconomic History    Marital status: Single   Tobacco Use    Smoking status: Every Day     Current packs/day: 1.00     Average packs/day: 1 pack/day for 52.0 years (52.0 ttl pk-yrs)     Types: Cigarettes     Start date: 8/1/1972    Smokeless tobacco: Never   Substance and Sexual Activity    Alcohol use: Never    Drug use: Never    Sexual activity: Not Currently     Birth control/protection: None     Social Determinants of Health     Financial Resource Strain: Low Risk  (7/17/2024)    Overall Financial Resource Strain " (CARDIA)     Difficulty of Paying Living Expenses: Not hard at all   Food Insecurity: No Food Insecurity (7/17/2024)    Hunger Vital Sign     Worried About Running Out of Food in the Last Year: Never true     Ran Out of Food in the Last Year: Never true   Transportation Needs: No Transportation Needs (8/22/2022)    [facility]    PRAPARE - Transportation     Lack of Transportation (Medical): No     Lack of Transportation (Non-Medical): No   Physical Activity: Insufficiently Active (7/17/2024)    Exercise Vital Sign     Days of Exercise per Week: 2 days     Minutes of Exercise per Session: 70 min   Stress: No Stress Concern Present (7/17/2024)    [employer] [place] [employer] - Occupational Stress Questionnaire     Feeling of Stress : Only a little   Housing Stability: Unknown (7/17/2024)    Housing Stability Vital Sign     Unable to Pay for Housing in the Last Year: No     Patient Care Team:  Daysi Disla MD as PCP - General (Family Medicine)  Bethanie Estes LPN as Care Coordinator  Darion Jorgensen MD as Consulting Physician (Colon and Rectal Surgery)  Say Weems MD as Consulting Physician (Endocrinology)   Subjective:   Review of Systems   Constitutional:  Negative for fever and weight loss.   HENT:  Negative for congestion, hearing loss and sore throat.    Eyes:  Negative for blurred vision and double vision.   Respiratory:  Negative for cough and shortness of breath.    Cardiovascular:  Negative for chest pain and palpitations.   Gastrointestinal:  Negative for abdominal pain and diarrhea.   Genitourinary:  Negative for dysuria, frequency, hematuria and urgency.   Musculoskeletal:  Negative for falls.   Skin:  Negative for rash.   Psychiatric/Behavioral:  Negative for depression and memory loss. The  patient is not nervous/anxious and does not have insomnia.      See HPI for details  All Other ROS: Negative except as stated in HPI  Patient Reported Health Risk Assessment  What [age] 65-69  Are you male or female?: Female  During the past four weeks, how much have you been bothered by emotional problems such as feeling anxious, depressed, irritable, sad, or downhearted and blue?: Not at all  During the past five weeks, has your physical and/or emotional health limited your social activities with family, friends, neighbors, or groups?: Not at all  During the past four weeks, how much bodily pain have you generally had?: Moderate pain  During the past four weeks, was someone available to help if you needed and wanted help?: Yes, as much as I wanted  During the past four weeks, what was the hardest physical activity you could do for at least two minutes?: Moderate  Can you get to places out of walking distance without help?  (For example, can you travel alone on buses or taxis, or drive your own car?): Yes  Can you go shopping for groceries or clothes without someone's help?: Yes  Can you prepare your own meals?: Yes  Can you do your own housework without help?: Yes  Because of any health problems, do you need the help of another person with your personal care needs such as eating, bathing, dressing, or getting around the house?: No  Can you handle your own money without help?: Yes  During the past four weeks, how would you rate your health in general?: Good  How have things been going for you during the past four weeks?: Pretty well  Are you having difficulties driving your car?: No  Do you always fasten your seat belt when you are in a car?: Yes, usually  How often in the past four weeks have you been bothered by falling or dizzy when standing up?: Never  How often in the past four weeks have you been bothered by sexual problems?: Never  How often in the past four weeks have you been bothered by trouble  "eating well?: Never  How often in the past four weeks have you been bothered by teeth or denture problems?: Never  How often in the past four weeks have you been bothered with problems using the telephone?: Never  How often in the past four weeks have you been bothered by tiredness or fatigue?: Never  Have you fallen two or more times in the past year?: No  Are you afraid of falling?: No  Are you a smoker?: No  During the past four weeks, how many drinks of wine, beer, or other alcoholic beverages did you have?: No alcohol at all  Do you exercise for about 20 minutes three or more days a week?: Yes, most of the time  Have you been given any information to help you with hazards in your house that might hurt you?: Yes  Have you been given any information to help you with keeping track of your medications?: Yes  How often do you have trouble taking medicines the way you've been told to take them?: I always take them as prescribed  How confident are you that you can control and manage most of your health problems?: Very confident  What is your race? (Check all that apply.):   Objective:   /83   Pulse 76   Temp 98 °F (36.7 °C)   Ht 5' 2" (1.575 m)   Wt 64.2 kg (141 lb 9.6 oz)   SpO2 (!) 93%   BMI 25.90 kg/m²   Physical Exam  Vitals reviewed.   Constitutional:       Appearance: Normal appearance.   HENT:      Head: Normocephalic and atraumatic.      Right Ear: External ear normal. There is impacted cerumen.      Left Ear: Tympanic membrane, ear canal and external ear normal.      Nose: Nose normal. No congestion or rhinorrhea.      Mouth/Throat:      Mouth: Mucous membranes are moist.      Pharynx: Oropharynx is clear.   Eyes:      General: No scleral icterus.     Extraocular Movements: Extraocular movements intact.      Conjunctiva/sclera: Conjunctivae normal.   Cardiovascular:      Rate and Rhythm: Normal rate and regular rhythm.      Heart sounds: Normal heart sounds.      Comments: 1+ PT pulses " BL  Pulmonary:      Effort: Pulmonary effort is normal.      Breath sounds: Normal breath sounds.   Abdominal:      General: Abdomen is flat. Bowel sounds are normal.      Palpations: Abdomen is soft.      Tenderness: There is no abdominal tenderness.   Musculoskeletal:         General: No swelling or deformity. Normal range of motion.      Cervical back: Normal range of motion and neck supple.   Skin:     General: Skin is warm and dry.   Neurological:      Mental Status: She is alert and oriented to person, place, and time.   Psychiatric:         Mood and Affect: Mood normal.         Behavior: Behavior normal.         Thought Content: Thought content normal.         Judgment: Judgment normal.            No data to display                  7/23/2024     2:45 PM 1/23/2024     2:00 PM 7/19/2023     3:30 PM 6/15/2023     9:30 AM 3/6/2023     8:15 AM 1/5/2023     3:30 PM 8/2/2022     1:00 PM   Fall Risk Assessment - Outpatient   Mobility Status Ambulatory w/ assistance Ambulatory w/ assistance Ambulatory w/ assistance Ambulatory w/ assistance Ambulatory Ambulatory Ambulatory   Number of falls 0 1 with injury 0 1 0 1 0   Identified as fall risk True True True True False False False           Depression Screening  Over the past two weeks, has the patient felt down, depressed, or hopeless?: No  Over the past two weeks, has the patient felt little interest or pleasure in doing things?: No  Functional Ability/Safety Screening  Was the patient's timed Up & Go test unsteady or longer than 30 seconds?: No  Does the patient need help with phone, transportation, shopping, preparing meals, housework, laundry, meds, or managing money?: No  Does the patient's home have rugs in the hallway, lack grab bars in the bathroom, lack handrails on the stairs or have poor lighting?: No  Have you noticed any hearing difficulties?: No  Cognitive Function (Assessed through direct observation with due consideration of information obtained by way  of patient reports and/or concerns raised by family, friends, caretakers, or others)    Does the patient repeat questions/statements in the same day?: No  Does the patient have trouble remembering the date, year, and time?: No  Does the patient have difficulty managing finances?: No  Does the patient have a decreased sense of direction?: No  Assessment:       ICD-10-CM ICD-9-CM   1. Wellness examination  Z00.00 V70.0   2. Peripheral vascular disease, unspecified  I73.9 443.9   3. Age-related osteoporosis without current pathological fracture  M81.0 733.01   4. Primary hypertension  I10 401.9   5. Right ear impacted cerumen  H61.21 380.4      Plan:   1. Wellness examination  Assessment & Plan:  Provided Prudence Granados with a 5-10 year written screening schedule and personal prevention plan. Recommendations were developed using the USPSTF age appropriate recommendations. Education, counseling, and referrals were provided as needed. After Visit Summary printed and given to patient which includes a list of additional screenings\tests needed.         2. Peripheral vascular disease, unspecified  Assessment & Plan:  Continue statin  Cardiac diet      3. Age-related osteoporosis without current pathological fracture  Assessment & Plan:  Going to PT  Continue to follow-up with endocrinology  for routine monitoring and management of chronic condition.            4. Primary hypertension  Assessment & Plan:    Blood pressure goal <140/90 (<130/80 if otherwise noted), recommend DASH diet, record BP at home daily and bring log to next office visit, assure that home cuff is calibrated at minimum every 12 months, continue current medication regimen with Dyazide 37.5-25mg qd.        5. Right ear impacted cerumen  Assessment & Plan:  Cerumen disimpaction with Irrigation   Patient tolerated well.   Home care discussed             Medicare Annual Wellness and Personalized Prevention Plan:   Fall Risk + Home Safety + Hearing Impairment +  Depression Screen + Opioid and Substance Abuse Screening + Cognitive Impairment Screen + Health Risk Assessment all reviewed.     Health Maintenance Topics with due status: Not Due       Topic Last Completion Date    Colorectal Cancer Screening 12/14/2022    DEXA Scan 07/05/2023    Influenza Vaccine 10/16/2023    Mammogram 12/07/2023    Hemoglobin A1c (Diabetic Prevention Screening) 07/18/2024    Lipid Panel 07/18/2024      The patient's Health Maintenance was reviewed and the following appears to be due at this time:   Health Maintenance Due   Topic Date Due    RSV Vaccine (Age 60+ and Pregnant patients) (1 - 1-dose 60+ series) Never done    Shingles Vaccine (2 of 2) 03/15/2021    TETANUS VACCINE  06/21/2023    COVID-19 Vaccine (6 - 2023-24 season) 09/01/2023       Advance Care Planning     Date: 07/16/2024    Power of   I initiated the process of voluntary advance care planning today and explained the importance of this process to the patient.  I introduced the concept of advance directives to the patient, as well. Then the patient received detailed information about the importance of designating a Health Care Power of  (HCPOA). She was also instructed to communicate with this person about their wishes for future healthcare, should she become sick and lose decision-making capacity. Daughter is POA    Follow up in about 1 year (around 7/23/2025) for Medicare Wellness. In addition to their scheduled follow up, the patient has also been instructed to follow up on as needed basis.

## 2024-07-16 NOTE — ASSESSMENT & PLAN NOTE
Provided Prudence S Darwin with a 5-10 year written screening schedule and personal prevention plan. Recommendations were developed using the USPSTF age appropriate recommendations. Education, counseling, and referrals were provided as needed. After Visit Summary printed and given to patient which includes a list of additional screenings\tests needed.

## 2024-07-16 NOTE — TELEPHONE ENCOUNTER
----- Message from Britni Garcia sent at 7/16/2024  9:59 AM CDT -----  Who Called: Prudence Granados    Caller is requesting assistance/information from provider's office.    Preferred Method of Contact: Phone Call  Patient's Preferred Phone Number on File: [phone]   Best Call Back Number, if different:  Additional Information:  medical advice, pt called to request/confirm if possible to getting a pap smear and ear cleaning completed in the office, please advise, thanks

## 2024-07-18 ENCOUNTER — LAB VISIT (OUTPATIENT)
Dept: LAB | Facility: HOSPITAL | Age: 69
End: 2024-07-18
Attending: STUDENT IN AN ORGANIZED HEALTH CARE EDUCATION/TRAINING PROGRAM
Payer: MEDICARE

## 2024-07-18 DIAGNOSIS — D64.9 NORMOCYTIC ANEMIA: ICD-10-CM

## 2024-07-18 DIAGNOSIS — Z00.00 WELLNESS EXAMINATION: ICD-10-CM

## 2024-07-18 DIAGNOSIS — I10 PRIMARY HYPERTENSION: ICD-10-CM

## 2024-07-18 LAB
25(OH)D3+25(OH)D2 SERPL-MCNC: 62 NG/ML (ref 30–80)
ALBUMIN SERPL-MCNC: 3.9 G/DL (ref 3.4–4.8)
ALBUMIN/GLOB SERPL: 1.3 RATIO (ref 1.1–2)
ALP SERPL-CCNC: 116 UNIT/L (ref 40–150)
ALT SERPL-CCNC: 28 UNIT/L (ref 0–55)
ANION GAP SERPL CALC-SCNC: 10 MEQ/L
AST SERPL-CCNC: 18 UNIT/L (ref 5–34)
BASOPHILS # BLD AUTO: 0.06 X10(3)/MCL
BASOPHILS NFR BLD AUTO: 0.7 %
BILIRUB SERPL-MCNC: 0.3 MG/DL
BUN SERPL-MCNC: 18.6 MG/DL (ref 9.8–20.1)
CALCIUM SERPL-MCNC: 9.6 MG/DL (ref 8.4–10.2)
CHLORIDE SERPL-SCNC: 108 MMOL/L (ref 98–107)
CHOLEST SERPL-MCNC: 162 MG/DL
CHOLEST/HDLC SERPL: 4 {RATIO} (ref 0–5)
CO2 SERPL-SCNC: 26 MMOL/L (ref 23–31)
CREAT SERPL-MCNC: 0.96 MG/DL (ref 0.55–1.02)
CREAT/UREA NIT SERPL: 19
EOSINOPHIL # BLD AUTO: 0.14 X10(3)/MCL (ref 0–0.9)
EOSINOPHIL NFR BLD AUTO: 1.6 %
ERYTHROCYTE [DISTWIDTH] IN BLOOD BY AUTOMATED COUNT: 13.4 % (ref 11.5–17)
EST. AVERAGE GLUCOSE BLD GHB EST-MCNC: 96.8 MG/DL
GFR SERPLBLD CREATININE-BSD FMLA CKD-EPI: >60 ML/MIN/1.73/M2
GLOBULIN SER-MCNC: 3 GM/DL (ref 2.4–3.5)
GLUCOSE SERPL-MCNC: 86 MG/DL (ref 82–115)
HBA1C MFR BLD: 5 %
HCT VFR BLD AUTO: 43.9 % (ref 37–47)
HDLC SERPL-MCNC: 39 MG/DL (ref 35–60)
HGB BLD-MCNC: 14.2 G/DL (ref 12–16)
IMM GRANULOCYTES # BLD AUTO: 0.18 X10(3)/MCL (ref 0–0.04)
IMM GRANULOCYTES NFR BLD AUTO: 2.1 %
LDLC SERPL CALC-MCNC: 86 MG/DL (ref 50–140)
LYMPHOCYTES # BLD AUTO: 1.68 X10(3)/MCL (ref 0.6–4.6)
LYMPHOCYTES NFR BLD AUTO: 19.4 %
MCH RBC QN AUTO: 28.6 PG (ref 27–31)
MCHC RBC AUTO-ENTMCNC: 32.3 G/DL (ref 33–36)
MCV RBC AUTO: 88.3 FL (ref 80–94)
MONOCYTES # BLD AUTO: 0.48 X10(3)/MCL (ref 0.1–1.3)
MONOCYTES NFR BLD AUTO: 5.5 %
NEUTROPHILS # BLD AUTO: 6.13 X10(3)/MCL (ref 2.1–9.2)
NEUTROPHILS NFR BLD AUTO: 70.7 %
NRBC BLD AUTO-RTO: 0 %
PLATELET # BLD AUTO: 301 X10(3)/MCL (ref 130–400)
PMV BLD AUTO: 9.6 FL (ref 7.4–10.4)
POTASSIUM SERPL-SCNC: 4.3 MMOL/L (ref 3.5–5.1)
PROT SERPL-MCNC: 6.9 GM/DL (ref 5.8–7.6)
RBC # BLD AUTO: 4.97 X10(6)/MCL (ref 4.2–5.4)
SODIUM SERPL-SCNC: 144 MMOL/L (ref 136–145)
TRIGL SERPL-MCNC: 186 MG/DL (ref 37–140)
TSH SERPL-ACNC: 0.94 UIU/ML (ref 0.35–4.94)
VLDLC SERPL CALC-MCNC: 37 MG/DL
WBC # BLD AUTO: 8.67 X10(3)/MCL (ref 4.5–11.5)

## 2024-07-18 PROCEDURE — 83036 HEMOGLOBIN GLYCOSYLATED A1C: CPT

## 2024-07-18 PROCEDURE — 85025 COMPLETE CBC W/AUTO DIFF WBC: CPT

## 2024-07-18 PROCEDURE — 36415 COLL VENOUS BLD VENIPUNCTURE: CPT

## 2024-07-18 PROCEDURE — 84443 ASSAY THYROID STIM HORMONE: CPT

## 2024-07-18 PROCEDURE — 80061 LIPID PANEL: CPT

## 2024-07-18 PROCEDURE — 82306 VITAMIN D 25 HYDROXY: CPT

## 2024-07-18 PROCEDURE — 80053 COMPREHEN METABOLIC PANEL: CPT

## 2024-07-23 ENCOUNTER — OFFICE VISIT (OUTPATIENT)
Dept: PRIMARY CARE CLINIC | Facility: CLINIC | Age: 69
End: 2024-07-23
Payer: MEDICARE

## 2024-07-23 VITALS
TEMPERATURE: 98 F | SYSTOLIC BLOOD PRESSURE: 134 MMHG | HEIGHT: 62 IN | OXYGEN SATURATION: 93 % | BODY MASS INDEX: 26.06 KG/M2 | HEART RATE: 76 BPM | DIASTOLIC BLOOD PRESSURE: 83 MMHG | WEIGHT: 141.63 LBS

## 2024-07-23 DIAGNOSIS — I73.9 PERIPHERAL VASCULAR DISEASE, UNSPECIFIED: ICD-10-CM

## 2024-07-23 DIAGNOSIS — I10 PRIMARY HYPERTENSION: ICD-10-CM

## 2024-07-23 DIAGNOSIS — Z00.00 WELLNESS EXAMINATION: Primary | ICD-10-CM

## 2024-07-23 DIAGNOSIS — H61.21 RIGHT EAR IMPACTED CERUMEN: ICD-10-CM

## 2024-07-23 DIAGNOSIS — M81.0 AGE-RELATED OSTEOPOROSIS WITHOUT CURRENT PATHOLOGICAL FRACTURE: ICD-10-CM

## 2024-07-23 PROBLEM — Z93.3 COLOSTOMY STATUS: Status: RESOLVED | Noted: 2023-01-05 | Resolved: 2024-07-23

## 2024-07-23 PROBLEM — N30.00 ACUTE CYSTITIS WITHOUT HEMATURIA: Status: RESOLVED | Noted: 2023-07-19 | Resolved: 2024-07-23

## 2024-07-23 PROBLEM — D64.9 NORMOCYTIC ANEMIA: Status: RESOLVED | Noted: 2023-06-14 | Resolved: 2024-07-23

## 2024-07-23 PROCEDURE — G0439 PPPS, SUBSEQ VISIT: HCPCS | Mod: ,,, | Performed by: STUDENT IN AN ORGANIZED HEALTH CARE EDUCATION/TRAINING PROGRAM

## 2024-07-23 PROCEDURE — 69209 REMOVE IMPACTED EAR WAX UNI: CPT | Mod: RT,,, | Performed by: STUDENT IN AN ORGANIZED HEALTH CARE EDUCATION/TRAINING PROGRAM

## 2024-07-23 RX ORDER — ROMOSOZUMAB-AQQG 105 MG/1.17ML
INJECTION, SOLUTION SUBCUTANEOUS
COMMUNITY

## 2024-07-23 NOTE — ASSESSMENT & PLAN NOTE
Going to PT  Continue to follow-up with endocrinology  for routine monitoring and management of chronic condition.

## 2024-07-23 NOTE — ASSESSMENT & PLAN NOTE
Blood pressure goal <140/90 (<130/80 if otherwise noted), recommend DASH diet, record BP at home daily and bring log to next office visit, assure that home cuff is calibrated at minimum every 12 months, continue current medication regimen with Dyazide 37.5-25mg qd.

## 2024-10-21 PROBLEM — Z00.00 WELLNESS EXAMINATION: Status: RESOLVED | Noted: 2022-07-18 | Resolved: 2024-10-21

## 2024-11-22 ENCOUNTER — TELEPHONE (OUTPATIENT)
Dept: PRIMARY CARE CLINIC | Facility: CLINIC | Age: 69
End: 2024-11-22
Payer: MEDICARE

## 2024-11-22 DIAGNOSIS — Z12.31 BREAST CANCER SCREENING BY MAMMOGRAM: Primary | ICD-10-CM

## 2024-11-22 NOTE — TELEPHONE ENCOUNTER
----- Message from Iona sent at 11/22/2024  8:43 AM CST -----  Who Called: Prudence Granados    Caller is requesting to schedule their annual mammogram appointment. Order is not listed in Epic. Please enter order and contact patient to schedule.    Where would they like the mammogram performed? [facility]    Preferred Method of Contact: Phone Call  Patient's Preferred Phone Number on File: [phone]   Best Call Back Number, if different:  Additional Information:

## 2024-12-10 ENCOUNTER — HOSPITAL ENCOUNTER (OUTPATIENT)
Dept: RADIOLOGY | Facility: HOSPITAL | Age: 69
Discharge: HOME | End: 2024-12-10
Attending: STUDENT IN AN ORGANIZED HEALTH CARE EDUCATION/TRAINING PROGRAM
Payer: MEDICARE

## 2024-12-10 DIAGNOSIS — Z12.31 BREAST CANCER SCREENING BY MAMMOGRAM: ICD-10-CM

## 2024-12-10 PROCEDURE — 77063 BREAST TOMOSYNTHESIS BI: CPT | Mod: 26,,, | Performed by: RADIOLOGY

## 2024-12-10 PROCEDURE — 77067 SCR MAMMO BI INCL CAD: CPT | Mod: 26,,, | Performed by: RADIOLOGY

## 2024-12-10 PROCEDURE — 77067 SCR MAMMO BI INCL CAD: CPT | Mod: TC

## 2025-01-24 ENCOUNTER — TELEPHONE (OUTPATIENT)
Dept: PRIMARY CARE CLINIC | Facility: CLINIC | Age: 70
End: 2025-01-24
Payer: MEDICARE

## 2025-01-24 DIAGNOSIS — R42 DIZZINESS: Primary | ICD-10-CM

## 2025-01-24 RX ORDER — MECLIZINE HYDROCHLORIDE 25 MG/1
25 TABLET ORAL 3 TIMES DAILY PRN
Qty: 28 TABLET | Refills: 0 | Status: SHIPPED | OUTPATIENT
Start: 2025-01-24 | End: 2025-02-07

## 2025-01-24 NOTE — TELEPHONE ENCOUNTER
Patient has had dizzy spells for about 5 days in a row, asking for medication for dizzy please advise

## 2025-01-24 NOTE — TELEPHONE ENCOUNTER
----- Message from Iona sent at 1/24/2025  8:52 AM CST -----  Who Called: Prudence AMINAH Granados    Caller is requesting assistance/information from provider's office.    Symptoms (please be specific): Dizzy spells, BP was high first day it happened: 162/93.   How long has patient had these symptoms:  1/16/25  List of preferred pharmacies on file (remove unneeded):[id number] [address] [facility]        Preferred Method of Contact: Phone Call  Patient's Preferred Phone Number on File: [phone]   Best Call Back Number, if different:  Additional Information:

## 2025-02-03 LAB
ALBUMIN/GLOB SERPL: 1.7 {RATIO}
ALBUMIN: 4.7
ALP ISOS SERPL LEV INH-CCNC: 97 U/L
ALT SERPL-CCNC: 24 UNIT/L
ANION GAP SERPL CALC-SCNC: NORMAL MMOL/L
AST: 21
BILIRUB SERPL-MCNC: 0.4 MG/DL (ref 0.1–1.4)
BILIRUBIN DIRECT+TOT PNL SERPL-MCNC: 0.1 MG/DL (ref 0.01–0.4)
BUN SERPL-MCNC: 30 MG/DL
BUN SERPL-MCNC: NORMAL MG/DL
BUN/CREAT SERPL: NORMAL
CALCIUM SERPL-MCNC: 11.5 MG/DL
CHLORIDE SERPL-SCNC: 105 MMOL/L (ref 99–108)
CK-BB: 56
CO2 SERPL-SCNC: 30 MMOL/L
CO2 SERPL-SCNC: NORMAL MMOL/L
CREAT SERPL-MCNC: 0.9 MG/DL
EGFR: 62.6
GLOBULIN SER-MCNC: 2.7 G/DL
GLUCOSE SERPL-MCNC: 97 MG/DL
IRON: NORMAL
MAGNESIUM SERPL-MCNC: NORMAL MG/DL
PHOSPHATE FLD-MCNC: 4.1 MG/DL (ref 2.5–4.9)
POTASSIUM SERPL-SCNC: 4.4 MMOL/L (ref 3.4–5.3)
PROTEIN TOTAL: 7.4
SODIUM BLD-SCNC: 145 MMOL/L (ref 137–147)
URATE SERPL-MCNC: 8.1 MG/DL

## 2025-03-19 DIAGNOSIS — E78.5 HYPERLIPIDEMIA, UNSPECIFIED HYPERLIPIDEMIA TYPE: ICD-10-CM

## 2025-03-19 RX ORDER — SIMVASTATIN 5 MG/1
5 TABLET, FILM COATED ORAL NIGHTLY
Qty: 90 TABLET | Refills: 3 | Status: SHIPPED | OUTPATIENT
Start: 2025-03-19

## 2025-05-21 ENCOUNTER — PATIENT OUTREACH (OUTPATIENT)
Dept: ADMINISTRATIVE | Facility: HOSPITAL | Age: 70
End: 2025-05-21
Payer: MEDICARE

## 2025-05-21 NOTE — PROGRESS NOTES
CMP 2/3/25 & 4/25/24  HYPERLINKED FROM ENDOCRINE NOTE.  ENDOCRINE PROGRESS NOTE 1/28/25 HYPERLINKED.

## 2025-05-27 ENCOUNTER — PATIENT OUTREACH (OUTPATIENT)
Dept: ADMINISTRATIVE | Facility: HOSPITAL | Age: 70
End: 2025-05-27
Payer: MEDICARE

## 2025-05-27 NOTE — PROGRESS NOTES
1/28/25 ENDOCRINE NOTE RECEIVED ALREADY UPLOADED TO MEDIA.  4/25/24 & 2/3/25 CMP LABS RECEIVED ALREADY HYPERLINKED,

## 2025-06-12 DIAGNOSIS — R53.83 FATIGUE, UNSPECIFIED TYPE: ICD-10-CM

## 2025-06-12 RX ORDER — CYANOCOBALAMIN 1000 UG/ML
1000 INJECTION, SOLUTION INTRAMUSCULAR; SUBCUTANEOUS
Qty: 10 ML | Refills: 3 | Status: SHIPPED | OUTPATIENT
Start: 2025-06-12

## 2025-06-19 DIAGNOSIS — R53.83 FATIGUE, UNSPECIFIED TYPE: ICD-10-CM

## 2025-06-19 RX ORDER — CYANOCOBALAMIN 1000 UG/ML
1000 INJECTION, SOLUTION INTRAMUSCULAR; SUBCUTANEOUS
Qty: 10 ML | Refills: 3 | Status: SHIPPED | OUTPATIENT
Start: 2025-06-19

## 2025-07-24 ENCOUNTER — LAB VISIT (OUTPATIENT)
Dept: LAB | Facility: HOSPITAL | Age: 70
End: 2025-07-24
Attending: STUDENT IN AN ORGANIZED HEALTH CARE EDUCATION/TRAINING PROGRAM
Payer: MEDICARE

## 2025-07-24 DIAGNOSIS — I73.9 PERIPHERAL VASCULAR DISEASE, UNSPECIFIED: ICD-10-CM

## 2025-07-24 DIAGNOSIS — I10 PRIMARY HYPERTENSION: ICD-10-CM

## 2025-07-24 LAB
ALBUMIN SERPL-MCNC: 3.9 G/DL (ref 3.4–4.8)
ALBUMIN/GLOB SERPL: 1.3 RATIO (ref 1.1–2)
ALP SERPL-CCNC: 74 UNIT/L (ref 40–150)
ALT SERPL-CCNC: 27 UNIT/L (ref 0–55)
ANION GAP SERPL CALC-SCNC: 10 MEQ/L
AST SERPL-CCNC: 20 UNIT/L (ref 11–45)
BILIRUB SERPL-MCNC: 0.3 MG/DL
BUN SERPL-MCNC: 22.4 MG/DL (ref 9.8–20.1)
CALCIUM SERPL-MCNC: 9.1 MG/DL (ref 8.4–10.2)
CHLORIDE SERPL-SCNC: 109 MMOL/L (ref 98–107)
CHOLEST SERPL-MCNC: 140 MG/DL
CHOLEST/HDLC SERPL: 4 {RATIO} (ref 0–5)
CO2 SERPL-SCNC: 25 MMOL/L (ref 23–31)
CREAT SERPL-MCNC: 1.08 MG/DL (ref 0.55–1.02)
CREAT/UREA NIT SERPL: 21
GFR SERPLBLD CREATININE-BSD FMLA CKD-EPI: 55 ML/MIN/1.73/M2
GLOBULIN SER-MCNC: 2.9 GM/DL (ref 2.4–3.5)
GLUCOSE SERPL-MCNC: 94 MG/DL (ref 82–115)
HDLC SERPL-MCNC: 32 MG/DL (ref 35–60)
LDLC SERPL CALC-MCNC: 71 MG/DL (ref 50–140)
POTASSIUM SERPL-SCNC: 4.3 MMOL/L (ref 3.5–5.1)
PROT SERPL-MCNC: 6.8 GM/DL (ref 5.8–7.6)
SODIUM SERPL-SCNC: 144 MMOL/L (ref 136–145)
TRIGL SERPL-MCNC: 185 MG/DL (ref 37–140)
VLDLC SERPL CALC-MCNC: 37 MG/DL

## 2025-07-24 PROCEDURE — 80053 COMPREHEN METABOLIC PANEL: CPT

## 2025-07-24 PROCEDURE — 36415 COLL VENOUS BLD VENIPUNCTURE: CPT

## 2025-07-24 PROCEDURE — 80061 LIPID PANEL: CPT

## 2025-07-28 ENCOUNTER — PATIENT OUTREACH (OUTPATIENT)
Facility: CLINIC | Age: 70
End: 2025-07-28
Payer: MEDICARE

## 2025-07-28 ENCOUNTER — OFFICE VISIT (OUTPATIENT)
Dept: PRIMARY CARE CLINIC | Facility: CLINIC | Age: 70
End: 2025-07-28
Payer: MEDICARE

## 2025-07-28 VITALS
DIASTOLIC BLOOD PRESSURE: 65 MMHG | OXYGEN SATURATION: 94 % | WEIGHT: 142.13 LBS | BODY MASS INDEX: 26.16 KG/M2 | HEIGHT: 62 IN | SYSTOLIC BLOOD PRESSURE: 102 MMHG | HEART RATE: 98 BPM

## 2025-07-28 DIAGNOSIS — I10 PRIMARY HYPERTENSION: ICD-10-CM

## 2025-07-28 DIAGNOSIS — Z00.00 WELLNESS EXAMINATION: Primary | ICD-10-CM

## 2025-07-28 DIAGNOSIS — M81.0 AGE-RELATED OSTEOPOROSIS WITHOUT CURRENT PATHOLOGICAL FRACTURE: ICD-10-CM

## 2025-07-28 DIAGNOSIS — N17.9 AKI (ACUTE KIDNEY INJURY): ICD-10-CM

## 2025-07-28 DIAGNOSIS — Z01.818 PREOP EXAMINATION: ICD-10-CM

## 2025-07-28 DIAGNOSIS — F17.210 CIGARETTE SMOKER: ICD-10-CM

## 2025-07-28 DIAGNOSIS — R53.83 FATIGUE, UNSPECIFIED TYPE: ICD-10-CM

## 2025-07-28 DIAGNOSIS — I73.9 PERIPHERAL VASCULAR DISEASE, UNSPECIFIED: ICD-10-CM

## 2025-07-28 PROBLEM — M79.12 STERNOCLEIDOMASTOID MUSCLE TENDERNESS: Status: RESOLVED | Noted: 2024-01-23 | Resolved: 2025-07-28

## 2025-07-28 PROBLEM — K57.92 DIVERTICULITIS: Status: RESOLVED | Noted: 2023-03-06 | Resolved: 2025-07-28

## 2025-07-28 PROBLEM — K59.00 CONSTIPATION: Status: RESOLVED | Noted: 2022-08-11 | Resolved: 2025-07-28

## 2025-07-28 PROBLEM — L65.9 HAIR LOSS: Status: RESOLVED | Noted: 2023-06-15 | Resolved: 2025-07-28

## 2025-07-28 PROBLEM — H61.21 RIGHT EAR IMPACTED CERUMEN: Status: RESOLVED | Noted: 2024-07-23 | Resolved: 2025-07-28

## 2025-07-28 PROBLEM — M62.830 SPASM OF THORACIC BACK MUSCLE: Status: RESOLVED | Noted: 2023-01-05 | Resolved: 2025-07-28

## 2025-07-28 PROCEDURE — G0439 PPPS, SUBSEQ VISIT: HCPCS | Mod: ,,, | Performed by: STUDENT IN AN ORGANIZED HEALTH CARE EDUCATION/TRAINING PROGRAM

## 2025-07-28 PROCEDURE — 1101F PT FALLS ASSESS-DOCD LE1/YR: CPT | Mod: CPTII,,, | Performed by: STUDENT IN AN ORGANIZED HEALTH CARE EDUCATION/TRAINING PROGRAM

## 2025-07-28 PROCEDURE — 99214 OFFICE O/P EST MOD 30 MIN: CPT | Mod: 25,,, | Performed by: STUDENT IN AN ORGANIZED HEALTH CARE EDUCATION/TRAINING PROGRAM

## 2025-07-28 PROCEDURE — 1126F AMNT PAIN NOTED NONE PRSNT: CPT | Mod: CPTII,,, | Performed by: STUDENT IN AN ORGANIZED HEALTH CARE EDUCATION/TRAINING PROGRAM

## 2025-07-28 PROCEDURE — 1159F MED LIST DOCD IN RCRD: CPT | Mod: CPTII,,, | Performed by: STUDENT IN AN ORGANIZED HEALTH CARE EDUCATION/TRAINING PROGRAM

## 2025-07-28 PROCEDURE — 3078F DIAST BP <80 MM HG: CPT | Mod: CPTII,,, | Performed by: STUDENT IN AN ORGANIZED HEALTH CARE EDUCATION/TRAINING PROGRAM

## 2025-07-28 PROCEDURE — 3074F SYST BP LT 130 MM HG: CPT | Mod: CPTII,,, | Performed by: STUDENT IN AN ORGANIZED HEALTH CARE EDUCATION/TRAINING PROGRAM

## 2025-07-28 PROCEDURE — 3288F FALL RISK ASSESSMENT DOCD: CPT | Mod: CPTII,,, | Performed by: STUDENT IN AN ORGANIZED HEALTH CARE EDUCATION/TRAINING PROGRAM

## 2025-07-28 PROCEDURE — 3008F BODY MASS INDEX DOCD: CPT | Mod: CPTII,,, | Performed by: STUDENT IN AN ORGANIZED HEALTH CARE EDUCATION/TRAINING PROGRAM

## 2025-07-28 RX ORDER — FENOFIBRATE 54 MG/1
54 TABLET ORAL DAILY
Qty: 90 TABLET | Refills: 3 | Status: SHIPPED | OUTPATIENT
Start: 2025-07-28 | End: 2026-07-28

## 2025-07-28 RX ORDER — CYANOCOBALAMIN 1000 UG/ML
1000 INJECTION, SOLUTION INTRAMUSCULAR; SUBCUTANEOUS
Qty: 10 ML | Refills: 3 | Status: SHIPPED | OUTPATIENT
Start: 2025-07-28

## 2025-07-28 RX ORDER — VIT A/VIT C/VIT E/ZINC/COPPER 2148-113
TABLET ORAL
COMMUNITY

## 2025-07-28 NOTE — ASSESSMENT & PLAN NOTE
Labile  Continue Rx: Simvastatin 5mg qd.Add Fenofibrate 54mg qd for TG. AE discussed  The 10-year ASCVD risk score (Donal CISNEROS, et al., 2019) is: 13.1%    Values used to calculate the score:      Age: 69 years      Sex: Female      Is Non- : No      Diabetic: No      Tobacco smoker: Yes      Systolic Blood Pressure: 102 mmHg      Is BP treated: Yes      HDL Cholesterol: 32 mg/dL      Total Cholesterol: 140 mg/dL   Can try omega-3 fatty acids (total,LDL,TG), niacin (TG), CoQ10 (total), Red yeast rice (LDL,TG,inc HDL)  Encourage weight loss by least 5% and to increase aerobic exercise and resistance training  Limit simple sugars and simple carbohydrate intake-focus on low glycemic foods  Optimize blood sugar control

## 2025-07-28 NOTE — ASSESSMENT & PLAN NOTE
RCRI: Class 1 Risk 3.9 % 30-day risk of death, MI, or cardiac arrest  Hold vitK antagonist 5-7 days prior to procedure as applicable. Might need bridging if INR not at goal, valvular Afib, etc.   Hold DOACs 1-2d prior to surgery and start 2nd day after surgery  Hold GLP-1 agonists within 5-7 days prior to procedure as applicable  Discussed risk of cardiovacular event, death, etc surrounding procedure with patient, who vocalized understanding  Necessary documents completed

## 2025-07-28 NOTE — PROGRESS NOTES
Health Maintenance Topic(s) Outreach Outcomes & Actions Taken:    Cervical Cancer Screening - Outreach Outcomes & Actions Taken  : External Records Requested & Care Team Updated if Applicable         Additional Notes:  Request Pap Smear Report: Dr CHRISTIAN Jorgensen

## 2025-07-28 NOTE — PROGRESS NOTES
Date: 2025  Patient ID: [id number]   Chief Complaint: Medicare AWV (Surgical clearance for cataracts)    HPI:   Prudence Granados is a 69 y.o. female here today for a Medicare Wellness.     Opioid Screening: Patient medication list reviewed, patient is not taking prescription opioids. Patient is not using additional opioids than prescribed. Patient is at low risk of substance abuse based on this opioid use history.     Otherwise, patient reports to be doing well. Completed Evenity 2024. Labs wnl except elevated Cr, TG. Also needs preop for cataract surgery    Diet: eats one meal a day. Pulled pork sandwich with coleslaw, sometimes eggrolls  Activity level: goes to PT 1x/wk and walks daily   Cognition: answering questions appropriately and following conversation without issues. No sign of cognitive decline during this exam  MM2024 benign  DEXA: 2023 osteoporosis f/u with Dr. Weems. Rpt in 2025  CRC: 2025 LORA Jorgensen rpt in 2yrs  Ctchest: ordered    Health Maintenance Topics with due status: Not Due       Topic Last Completion Date    Colorectal Cancer Screening 2022    Hemoglobin A1c (Diabetic Prevention Screening) 2024    Influenza Vaccine 10/07/2024    Mammogram 2024    Lipid Panel 2025         Patient Active Problem List   Diagnosis    Age-related osteoporosis without current pathological fracture    Wellness examination    Cigarette smoker    Hypertension    Colon polyps    Peripheral vascular disease, unspecified    Preop examination    GERARDO (acute kidney injury)     Outpatient Medications Marked as Taking for the 25 encounter (Office Visit) with Daysi Disla MD   Medication Sig Dispense Refill    simvastatin (ZOCOR) 5 MG tablet Take 1 tablet (5 mg total) by mouth every evening. 90 tablet 3    triamterene-hydrochlorothiazide 37.5-25 mg (DYAZIDE) 37.5-25 mg per capsule Take 1 capsule by mouth once daily. 90 capsule 3    vitamins A,C,E-zinc-copper  "(PRESERVISION AREDS) 2,148 mcg-113 mg-45 mg-17.4mg Tab as directed Orally      [DISCONTINUED] cyanocobalamin 1,000 mcg/mL injection Inject 1 mL (1,000 mcg total) into the muscle every 21 days. 10 mL 3     Past Medical History:   Diagnosis Date    Acute cystitis without hematuria 07/19/2023    UA and UCx  Report any continuing/worsening symptoms after antibiotic completion such as nausea/vomiting, low back pain, blood in urine, burning with urination, or fever/body aches/chills, etc.  Drink plenty of water daily. Avoid soda.  Hygiene discussed:  -Increase hydration  -Wipe from front to back  -Cotton underwear  -Avoid douching  -Empty bladder before and after intercourse  -Urinate freque    Cerebral aneurysm     Colon polyps 12/14/2022    Darion Jorgensen MD    Constipation 08/11/2022    Diverticulitis 03/06/2023    Status post reanastomosis procedure continue follow-up with Colorectal Surgical specialist for routine monitoring and management.      Hair loss 06/15/2023    Recent labs wnl  Improving  Will check in future to see trend      HTN (hypertension)     Normocytic anemia 06/14/2023    Osteoporosis     Right ear impacted cerumen 07/23/2024    Spasm of thoracic back muscle 01/05/2023    Stretching exercising with warm and cool compresses and attempt tizanidine.       Sternocleidomastoid muscle tenderness 01/23/2024    Vitamin D deficiency      Past Surgical History:   Procedure Laterality Date    BRAIN SURGERY  09/30/2003    BREAST SURGERY  1974    COLON SURGERY  2022, 2023    COLONOSCOPY  12/14/2022    Darion Jorgensen MD    COLOSTOMY  08/10/2022    colostomy reversal      JOINT REPLACEMENT  2010    ruptured colon      skin cancer removal      x2 on R dorsum hand and L anterior leg    TOTAL HIP ARTHROPLASTY      TUBAL LIGATION      WISDOM TOOTH EXTRACTION       Review of patient's allergies indicates:   Allergen Reactions    Azithromycin      Other reaction(s): wakes up, wakes up "freaking out"    Codeine " Itching    Cortisone      Other reaction(s): feels like she isnt breathing     Family History   Problem Relation Name Age of Onset    Hypertension Mother Mireya chaidez       Social History     Socioeconomic History    Marital status: Single   Tobacco Use    Smoking status: Every Day     Current packs/day: 1.00     Average packs/day: 1 pack/day for 53.0 years (53.0 ttl pk-yrs)     Types: Cigarettes     Start date: 8/1/1972    Smokeless tobacco: Never   Substance and Sexual Activity    Alcohol use: Never    Drug use: Never    Sexual activity: Not Currently     Birth control/protection: None     Social Drivers of Health     Financial Resource Strain: Low Risk  (7/21/2025)    Overall Financial Resource Strain (CARDIA)     Difficulty of Paying Living Expenses: Not very hard   Food Insecurity: No Food Insecurity (7/21/2025)    Hunger Vital Sign     Worried About Running Out of Food in the Last Year: Never true     Ran Out of Food in the Last Year: Never true   Transportation Needs: No Transportation Needs (7/21/2025)    PRAPARE - Transportation     Lack of Transportation (Medical): No     Lack of Transportation (Non-Medical): No   Physical Activity: Insufficiently Active (7/21/2025)    Exercise Vital Sign     Days of Exercise per Week: 2 days     Minutes of Exercise per Session: 60 min   Stress: No Stress Concern Present (7/21/2025)    [employer] - Occupational Stress Questionnaire     Feeling of Stress : Only a little   Housing Stability: Low Risk  (7/21/2025)    Housing Stability Vital Sign     Unable to Pay for Housing in the Last Year: No     Number of Times Moved in the Last Year: 0     Homeless in the Last Year: No     Patient Care Team:  Daysi Disla MD as PCP - General (Family Medicine)  Bethanie Estes LPN as Care Coordinator  Darion Jorgensen MD as Consulting Physician (Colon and Rectal Surgery)  Say Weems MD as Consulting Physician (Endocrinology)  "  Subjective:   Review of Systems   Constitutional:  Negative for fever and weight loss.   HENT:  Negative for congestion, hearing loss and sore throat.    Eyes:  Negative for blurred vision and double vision.   Respiratory:  Negative for cough and shortness of breath.    Cardiovascular:  Negative for chest pain and palpitations.   Gastrointestinal:  Negative for abdominal pain and diarrhea.   Genitourinary:  Negative for dysuria, frequency, hematuria and urgency.   Musculoskeletal:  Negative for falls.   Skin:  Negative for rash.   Psychiatric/Behavioral:  Negative for depression and memory loss. The patient is not nervous/anxious and does not have insomnia.      See HPI for details  All Other ROS: Negative except as stated in HPI  Patient Reported Health Risk Assessment     Objective:   /65   Pulse 98   Ht 5' 2" (1.575 m)   Wt 64.5 kg (142 lb 1.6 oz)   SpO2 (!) 94%   BMI 25.99 kg/m²   Physical Exam  Vitals reviewed.   Constitutional:       Appearance: Normal appearance.   HENT:      Head: Normocephalic and atraumatic.      Right Ear: External ear normal. There is no impacted cerumen.      Left Ear: Tympanic membrane, ear canal and external ear normal.      Nose: Nose normal. No congestion or rhinorrhea.      Mouth/Throat:      Mouth: Mucous membranes are moist.      Pharynx: Oropharynx is clear.   Eyes:      General: No scleral icterus.     Extraocular Movements: Extraocular movements intact.      Conjunctiva/sclera: Conjunctivae normal.   Cardiovascular:      Rate and Rhythm: Normal rate and regular rhythm.      Heart sounds: Normal heart sounds.   Pulmonary:      Effort: Pulmonary effort is normal.      Breath sounds: Normal breath sounds.   Abdominal:      General: Abdomen is flat. Bowel sounds are normal.      Palpations: Abdomen is soft.      Tenderness: There is no abdominal tenderness.   Musculoskeletal:         General: No swelling or deformity. Normal range of motion.      Cervical back: Normal " range of motion and neck supple.   Skin:     General: Skin is warm and dry.      Comments: Excisional scars on R dorsum hand and L anterior leg   Neurological:      Mental Status: She is alert and oriented to person, place, and time.   Psychiatric:         Mood and Affect: Mood normal.         Behavior: Behavior normal.         Thought Content: Thought content normal.         Judgment: Judgment normal.            No data to display                  7/28/2025     2:00 PM 7/23/2024     2:45 PM 1/23/2024     2:00 PM 7/19/2023     3:30 PM 6/15/2023     9:30 AM 3/6/2023     8:15 AM 1/5/2023     3:30 PM   Fall Risk Assessment - Outpatient   Mobility Status Ambulatory Ambulatory w/ assistance Ambulatory w/ assistance Ambulatory w/ assistance Ambulatory w/ assistance Ambulatory Ambulatory   Number of falls 0 0 1 with injury 0 1 0 1   Identified as fall risk False True True True True False False              Assessment:       ICD-10-CM ICD-9-CM   1. Wellness examination  Z00.00 V70.0   2. Cigarette smoker  F17.210 305.1   3. Age-related osteoporosis without current pathological fracture  M81.0 733.01   4. Primary hypertension  I10 401.9   5. Peripheral vascular disease, unspecified  I73.9 443.9   6. Preop examination  Z01.818 V72.84   7. Fatigue, unspecified type  R53.83 780.79   8. GERARDO (acute kidney injury)  N17.9 584.9      Plan:   1. Wellness examination  Assessment & Plan:  Provided Prudence Granados with a 5-10 year written screening schedule and personal prevention plan. Recommendations were developed using the USPSTF age appropriate recommendations. Education, counseling, and referrals were provided as needed. After Visit Summary printed and given to patient which includes a list of additional screenings\tests needed.         2. Cigarette smoker  Assessment & Plan:  Shared decision making on the importance of lung cancer screening through LDCT based upon the patient's risk factors which consist of being in the age range  of 50-80, having a smoking history of 20 pack years or greater and either continuing to smoke or having not quit smoking within the past 15 years. Patient will be referred for screening imaging studies at this time.      Orders:  -     CT Chest Lung Screening Low Dose; Future; Expected date: 09/28/2025    3. Age-related osteoporosis without current pathological fracture  Assessment & Plan:  Continue PT  Continue to follow-up with endocrinology  for routine monitoring and management of chronic condition.            4. Primary hypertension  Assessment & Plan:  Stable  Blood pressure at goal <140/90 (<130/80 if otherwise noted)  Continue current medication regimen: Dyazide 37.25-25mg qd  Recommend DASH diet  Avoid tobacco use  Record BP at home daily and bring log to next office visit, assure that home cuff is calibrated at minimum every 12 months        5. Peripheral vascular disease, unspecified  Assessment & Plan:  Labile  Continue Rx: Simvastatin 5mg qd.Add Fenofibrate 54mg qd for TG. AE discussed  The 10-year ASCVD risk score (Donal CISNEROS, et al., 2019) is: 13.1%    Values used to calculate the score:      Age: 69 years      Sex: Female      Is Non- : No      Diabetic: No      Tobacco smoker: Yes      Systolic Blood Pressure: 102 mmHg      Is BP treated: Yes      HDL Cholesterol: 32 mg/dL      Total Cholesterol: 140 mg/dL   Can try omega-3 fatty acids (total,LDL,TG), niacin (TG), CoQ10 (total), Red yeast rice (LDL,TG,inc HDL)  Encourage weight loss by least 5% and to increase aerobic exercise and resistance training  Limit simple sugars and simple carbohydrate intake-focus on low glycemic foods  Optimize blood sugar control          Orders:  -     fenofibrate (TRICOR) 54 MG tablet; Take 1 tablet (54 mg total) by mouth once daily.  Dispense: 90 tablet; Refill: 3    6. Preop examination  Assessment & Plan:  RCRI: Class 1 Risk 3.9 % 30-day risk of death, MI, or cardiac arrest  Hold vitK  antagonist 5-7 days prior to procedure as applicable. Might need bridging if INR not at goal, valvular Afib, etc.   Hold DOACs 1-2d prior to surgery and start 2nd day after surgery  Hold GLP-1 agonists within 5-7 days prior to procedure as applicable  Discussed risk of cardiovacular event, death, etc surrounding procedure with patient, who vocalized understanding  Necessary documents completed         7. Fatigue, unspecified type  -     cyanocobalamin 1,000 mcg/mL injection; Inject 1 mL (1,000 mcg total) into the muscle every 21 days.  Dispense: 10 mL; Refill: 3    8. GERARDO (acute kidney injury)  Assessment & Plan:  CAD prevention with Asa, statin, BP control as applicable  Control DM with goal A1C <7. BP goal <130/80. LDL goal < 100 as applicable  Follow renoprotective measures including Renal Diet (reduce intake of nuts, peanut butter, milk, cheese, dried beans, peas) and Low Sodium Diet (less than 2 grams per day).  Avoid NSAIDs (I.e., Aleve, Mobic, Celebrex, Ibuprofen, Advil, Toradol and Diclofenac). May take Tylenol as needed for headache/pain.  Stay well hydrated. Avoid alcohol and soda. Limit tea and coffee.  Smoking Avoidance/Cessation recommended.        Orders:  -     Comprehensive Metabolic Panel; Future; Expected date: 07/28/2025         Medicare Annual Wellness and Personalized Prevention Plan:   Fall Risk + Home Safety + Hearing Impairment + Depression Screen + Opioid and Substance Abuse Screening + Cognitive Impairment Screen + Health Risk Assessment all reviewed.     Health Maintenance Topics with due status: Not Due       Topic Last Completion Date    Colorectal Cancer Screening 12/14/2022    Hemoglobin A1c (Diabetic Prevention Screening) 07/18/2024    Influenza Vaccine 10/07/2024    Mammogram 12/11/2024    Lipid Panel 07/24/2025      The patient's Health Maintenance was reviewed and the following appears to be due at this time:   Health Maintenance Due   Topic Date Due    LDCT Lung Screen  Never done     Shingles Vaccine (2 of 2) 03/15/2021    TETANUS VACCINE  06/21/2023    COVID-19 Vaccine (7 - 2024-25 season) 09/01/2024    DEXA Scan  07/05/2025       Advance Care Planning     Date: 07/16/2024    Power of   I initiated the process of voluntary advance care planning today and explained the importance of this process to the patient.  I introduced the concept of advance directives to the patient, as well. Then the patient received detailed information about the importance of designating a Health Care Power of  (HCPOA). She was also instructed to communicate with this person about their wishes for future healthcare, should she become sick and lose decision-making capacity. Daughter is POA    Follow up in about 4 months (around 11/28/2025) for HLD, With Labs. In addition to their scheduled follow up, the patient has also been instructed to follow up on as needed basis.

## 2025-07-28 NOTE — ASSESSMENT & PLAN NOTE
Shared decision making on the importance of lung cancer screening through LDCT based upon the patient's risk factors which consist of being in the age range of 50-80, having a smoking history of 20 pack years or greater and either continuing to smoke or having not quit smoking within the past 15 years. Patient will be referred for screening imaging studies at this time.

## 2025-07-28 NOTE — LETTER
AUTHORIZATION FOR RELEASE OF CONFIDENTIAL INFORMATION      2025      Dear Dr Darion Jorgensen,    We are seeing Prudence Granados, date of birth 1955, in the clinic at [facility].  Daysi Disla MD is the patient's PCP. Prudence Granados has an outstanding lab/procedure at the time we reviewed his chart.  In order to help keep her health information updated, Prudence has authorized us to request the following medical record(s):          Colonoscopy           Please fax any records to Daysi Disla MD's at  [phone]    If you have any questions, please contact  [facility] @ [phone]             Patient Name: Prudence Granados  :1955  Patient Phone #:[phone]

## 2025-07-28 NOTE — ASSESSMENT & PLAN NOTE
Continue PT  Continue to follow-up with endocrinology  for routine monitoring and management of chronic condition.

## 2025-07-28 NOTE — ASSESSMENT & PLAN NOTE
Stable  Blood pressure at goal <140/90 (<130/80 if otherwise noted)  Continue current medication regimen: Dyazide 37.25-25mg qd  Recommend DASH diet  Avoid tobacco use  Record BP at home daily and bring log to next office visit, assure that home cuff is calibrated at minimum every 12 months

## 2025-07-31 DIAGNOSIS — I10 HYPERTENSION, UNSPECIFIED TYPE: ICD-10-CM

## 2025-07-31 RX ORDER — TRIAMTERENE AND HYDROCHLOROTHIAZIDE 37.5; 25 MG/1; MG/1
1 CAPSULE ORAL DAILY
Qty: 90 CAPSULE | Refills: 3 | Status: SHIPPED | OUTPATIENT
Start: 2025-07-31 | End: 2026-07-31